# Patient Record
Sex: MALE | Race: WHITE | NOT HISPANIC OR LATINO | Employment: STUDENT | URBAN - METROPOLITAN AREA
[De-identification: names, ages, dates, MRNs, and addresses within clinical notes are randomized per-mention and may not be internally consistent; named-entity substitution may affect disease eponyms.]

---
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
## 2023-02-15 ENCOUNTER — OFFICE VISIT (OUTPATIENT)
Dept: GASTROENTEROLOGY | Facility: MEDICAL CENTER | Age: 18
End: 2023-02-15

## 2023-02-15 VITALS
BODY MASS INDEX: 21.68 KG/M2 | HEIGHT: 76 IN | OXYGEN SATURATION: 97 % | DIASTOLIC BLOOD PRESSURE: 66 MMHG | HEART RATE: 80 BPM | SYSTOLIC BLOOD PRESSURE: 106 MMHG | WEIGHT: 178 LBS

## 2023-02-15 DIAGNOSIS — K50.80 CROHN'S DISEASE OF BOTH SMALL AND LARGE INTESTINE WITHOUT COMPLICATION (HCC): Primary | ICD-10-CM

## 2023-02-15 NOTE — PROGRESS NOTES
[facility] - Outpatient Follow-up Note  George Rodriguez 21 y o  male MRN: [id number]  Encounter: [id number]          ASSESSMENT AND PLAN:   78-year-old male with history of recently diagnosed ileocolonic Crohn's disease not currently on treatment who presents for follow-up evaluation  1  Crohn's disease of both small and large intestine without complication ([facility] [address] )  He was found to have moderate ileal Crohn's disease on recent colonoscopy and CT enterography also showed wall thickening, mural hyperenhancement in the distal TI and a second segment of the ileum  At his last office visit he was provided information regarding multiple biologic medications including Humira, Stelara, Remicade given his moderate ileal Crohn's disease  He was not interested on starting biologic at that time and wanted to research the medications and dietary treaments before making a decision    During the visit today he states he is not interested in starting a biologic until after he finishes college, in 2025 due to concerns for taking and committing to the medication while at school  He was inquiring about starting prednisone or staying off treatment until then  I discussed that without treatment for his Crohn's disease the concern is for long-term development of complications such as small bowel strictures, bowel obstructions, fistulas, abscesses etc   I discussed I again recommend initiation of a biologic medication to prevent these complicatinos  We discussed that prednisone can be initiated but it is not a good long-term option for treatment due to steroid related side effects  If he is not interested in treatment, we can discuss induction of remission with prednisone, consider initiation of a mesalamine treatment such as Pentasa to try and maintain remission though I again reiterated that mesalamine treatments may not be sufficient given the severity of his Crohn's disease      He would like to research mesalamine and steroid medications and will let me know about a decision  In the meantime I recommend he obtain his previously ordered pre- biologic blood work in addition to routine blood work  - Hepatitis C antibody; Future  - Hepatitis B surface antigen; Future  - Hepatitis B core antibody, total; Future  - TPMT ENZYME ACTIVITY,BLOOD; Future  - CBC and differential; Future  - Iron Panel (Includes Ferritin, Iron Sat%, Iron, and TIBC); Future  - Vitamin B12; Future  - Vitamin D 25 hydroxy; Future  - Quantiferon TB Gold Plus; Future  - Calprotectin,Fecal; Future      Health maintenance:  - Yearly flu shot, COVID vaccination, Pneumovax/Prevnar per PCP      Return to clinic in 2 months      ______________________________________________________________________    SUBJECTIVE: 49-year-old male with history of recently diagnosed ileocolonic Crohn's disease not currently on treatment who presents for follow-up evaluation  He was last seen in the GI office November 2022  He initially presented a few months prior with bloody stools and unintentional weight loss  He underwent EGD and colonoscopy October 2022  EGD was normal other than small hiatal hernia  Colonoscopy showed moderate ulcerated and edematous, erythematous mucosa at the ileocecal valve and in the terminal ileum and patchy erythema of the sigmoid colon  Pathology of the terminal ileum showed increased mild architectural distortion and increased lymphoplasmacytic infiltration  Colonic biopsies were unremarkable  Gastric biopsies showed moderate chronic inactive gastritis negative for H  pylori and normal duodenal biopsies        December 2022 CT enterography showed active inflammatory bowel disease that luminal narrowing in the terminal ileum and a possible second site in the ileum    His last office visit he was provided information regarding multiple biologic options for treatment was unsure if he wanted to start treatment  Interval history: He denies abdominal cramping or abdominal pain  He has a bowel movement 1-3 times per day which is formed  At times he can have intermittent rectal bleeding  He reports appetite is stable as well as weight  He reports fatigue  He denies chronic joint discomfort, rashes  REVIEW OF SYSTEMS IS OTHERWISE NEGATIVE  10 point review of systems is negative other than stated as per HPI      Historical Information   Past Medical History:   Diagnosis Date   • Acne      Past Surgical History:   Procedure Laterality Date   • NOSE SURGERY       Social History   Social History     Substance and Sexual Activity   Alcohol Use Yes     Social History     Substance and Sexual Activity   Drug Use Yes   • Types: Marijuana    Comment: in past     Social History     Tobacco Use   Smoking Status Never   Smokeless Tobacco Never     Family History   Problem Relation Age of Onset   • Celiac disease Mother    • Skin cancer Father    • Eczema Father    • Hypertension Father    • Diabetes Maternal Grandmother    • Hypertension Maternal Grandmother    • Skin cancer Maternal Grandfather    • Ulcerative colitis Maternal Grandfather    • Skin cancer Paternal Grandmother    • Cancer Paternal Grandmother    • Skin cancer Paternal Grandfather    • Colon cancer Maternal Aunt    • Stroke Paternal Aunt        Meds/Allergies     No current outpatient medications on file  No Known Allergies        Objective     Blood pressure 106/66, pulse 80, height 6' 4" (1 93 m), weight 80 7 kg (178 lb), SpO2 97 %  Body mass index is 21 67 kg/m²  PHYSICAL EXAM:      General Appearance:   Alert, cooperative, no distress   HEENT:   Normocephalic, atraumatic, anicteric  Neck:  Supple, symmetrical, trachea midline   Lungs:   Clear to auscultation bilaterally; no rales, rhonchi or wheezing; respirations unlabored    Heart[de-identified]   Regular rate and rhythm; no murmur, rub, or gallop     Abdomen:   Soft, left-sided abdominal tenderness to deep palpation without rebound or guarding, non-distended; normal bowel sounds; no masses, no organomegaly    Genitalia:   Deferred    Rectal:   Deferred    Extremities:  No cyanosis, clubbing or edema    Pulses:  2+ and symmetric    Skin:  No jaundice, rashes, or lesions    Lymph nodes:  No palpable cervical lymphadenopathy        Lab Results:   No visits with results within 1 Day(s) from this visit     Latest known visit with results is:   Hospital Outpatient Visit on 10/28/2022   Component Date Value   • Case Report 10/28/2022                      Value:Surgical Pathology Report                         Case: [id number]                                   Authorizing Provider:  Michelle Downey MD       Collected:           10/28/2022 1403              Ordering Location:     [address] [place] [address]        Received:            10/28/2022 [address] Endoscopy                                                     Pathologist:           Aislinn Alvarado MD                                                           Specimens:   A) - Duodenum, Duodenum bx-cold                                                                     B) - Stomach, Gastric bx-cold                                                                       C) - Terminal Ileum, Terminal ileum bx-cold IBD                                                     D) - Large Intestine, Cecum, Cecum bx-cold                                                          E) - Large Intestine, Right/Ascending Colon, Ascending colon bx-cold                                                          F) - Large Intestine, Transverse Colon, transverse colon bx-cold                                    G) - Large Intestine, Left/Descending Colon, Descending colon bx-cold                               H) - Large Intestine, Sigmoid Colon, Sigmoid colon bx-cold                                          I) - Polyp, Colorectal, Rectal polyp-cold bx                                                        J) - Rectum, Rectum bx-cold                                                               • Final Diagnosis 10/28/2022                      Value: This result contains rich text formatting which cannot be displayed here  • Additional Information 10/28/2022                      Value: This result contains rich text formatting which cannot be displayed here  • Gross Description 10/28/2022                      Value: This result contains rich text formatting which cannot be displayed here  • Clinical Information 10/28/2022                      Value:R/O Celiac disease         Radiology Results:   No results found

## 2023-02-17 ENCOUNTER — TELEPHONE (OUTPATIENT)
Dept: GASTROENTEROLOGY | Facility: MEDICAL CENTER | Age: 18
End: 2023-02-17

## 2023-02-17 NOTE — TELEPHONE ENCOUNTER
I called and spoke with the patient  We discussed options for treatment during his recent office visit including Biologics, mesalamine and steroids  He is not interested in the long-term treatment at this time such as a biologic  His Crohn's disease was diagnosed in October and he has not started therapy given his concern of starting a treatment while he is at school  I discussed another option of starting a budesonide taper in order to treat the active inflammation seen on his CT scan and colonoscopy  We discussed that budesonide is not a long-term option but may help in the short-term to help induce remission and then we can discussed long-term options going forward would like to think about this and discussed with his parents  I will send him information regarding steroids and budesonide  He has received information previously regarding mesalamine and Biologics  He will send me a [facility] [employer] message or call our office when he has made a decision      He verbalized understanding of the information and instructions provided

## 2023-03-01 DIAGNOSIS — K52.9 ILEITIS: ICD-10-CM

## 2023-03-01 DIAGNOSIS — K50.819 CROHN'S DISEASE OF SMALL AND LARGE INTESTINES WITH COMPLICATION (HCC): Primary | ICD-10-CM

## 2023-03-01 RX ORDER — BUDESONIDE 3 MG/1
CAPSULE, COATED PELLETS ORAL
Qty: 203 CAPSULE | Refills: 0 | Status: SHIPPED | OUTPATIENT
Start: 2023-03-01 | End: 2023-05-17

## 2023-03-03 ENCOUNTER — TELEPHONE (OUTPATIENT)
Dept: PSYCHIATRY | Facility: CLINIC | Age: 18
End: 2023-03-03

## 2023-03-03 NOTE — TELEPHONE ENCOUNTER
Writer contacted pt in regards to a vm we received requesting services and informed him that there is no opening available at this time   He declined to be added to wait list

## 2023-03-13 ENCOUNTER — TELEPHONE (OUTPATIENT)
Dept: PSYCHIATRY | Facility: CLINIC | Age: 18
End: 2023-03-13

## 2023-03-13 NOTE — TELEPHONE ENCOUNTER
Patient called in and has been added to non referral wait list for both talk therapy and med mgmt  No preference on provider, in person and confirmed insurance

## 2023-05-27 DIAGNOSIS — K52.9 ILEITIS: ICD-10-CM

## 2023-05-27 DIAGNOSIS — K50.819 CROHN'S DISEASE OF SMALL AND LARGE INTESTINES WITH COMPLICATION (HCC): ICD-10-CM

## 2023-05-27 RX ORDER — BUDESONIDE 3 MG/1
CAPSULE, COATED PELLETS ORAL
Qty: 90 CAPSULE | Refills: 2 | Status: SHIPPED | OUTPATIENT
Start: 2023-05-27 | End: 2023-08-12

## 2023-06-16 DIAGNOSIS — K50.819 CROHN'S DISEASE OF SMALL AND LARGE INTESTINES WITH COMPLICATION (HCC): Primary | ICD-10-CM

## 2023-06-16 RX ORDER — MESALAMINE 500 MG/1
1000 CAPSULE, EXTENDED RELEASE ORAL 4 TIMES DAILY
Qty: 120 CAPSULE | Refills: 3 | Status: SHIPPED | OUTPATIENT
Start: 2023-06-16 | End: 2023-06-21

## 2023-06-16 RX ORDER — MESALAMINE 500 MG/1
1000 CAPSULE, EXTENDED RELEASE ORAL 4 TIMES DAILY
Qty: 120 CAPSULE | Refills: 3 | Status: SHIPPED | OUTPATIENT
Start: 2023-06-16 | End: 2023-06-16

## 2023-06-19 DIAGNOSIS — K50.819 CROHN'S DISEASE OF SMALL AND LARGE INTESTINES WITH COMPLICATION (HCC): ICD-10-CM

## 2023-06-21 DIAGNOSIS — K50.819 CROHN'S DISEASE OF SMALL AND LARGE INTESTINES WITH COMPLICATION (HCC): ICD-10-CM

## 2023-06-21 RX ORDER — MESALAMINE 500 MG/1
1000 CAPSULE, EXTENDED RELEASE ORAL 4 TIMES DAILY
Qty: 120 CAPSULE | Refills: 3 | Status: SHIPPED | OUTPATIENT
Start: 2023-06-21

## 2023-06-21 RX ORDER — MESALAMINE 500 MG/1
CAPSULE, EXTENDED RELEASE ORAL
Qty: 120 CAPSULE | Refills: 3 | Status: SHIPPED | OUTPATIENT
Start: 2023-06-21 | End: 2023-06-21 | Stop reason: SDUPTHER

## 2023-08-09 ENCOUNTER — TELEPHONE (OUTPATIENT)
Age: 18
End: 2023-08-09

## 2023-08-09 DIAGNOSIS — K50.819 CROHN'S DISEASE OF SMALL AND LARGE INTESTINES WITH COMPLICATION (HCC): Primary | ICD-10-CM

## 2023-08-09 NOTE — TELEPHONE ENCOUNTER
Patients GI provider:  Dr. Tawanna Dee    Number to return call: [phone]    Reason for call: Wendi Obregon from [facility] in [facility] calling stating pt's Pentasa 500 mg medication is being discontinued and they were wondering if there is an alternative medication that can replace this. Wendi Obregon can be reached at the above number for any questions.     Scheduled procedure/appointment date if applicable: N/A

## 2023-08-10 DIAGNOSIS — K50.819 CROHN'S DISEASE OF SMALL AND LARGE INTESTINES WITH COMPLICATION (HCC): ICD-10-CM

## 2023-08-10 RX ORDER — MESALAMINE 500 MG/1
1000 CAPSULE, EXTENDED RELEASE ORAL 4 TIMES DAILY
Qty: 120 CAPSULE | Refills: 3 | Status: SHIPPED | OUTPATIENT
Start: 2023-08-10 | End: 2023-08-11

## 2023-08-11 DIAGNOSIS — K50.819 CROHN'S DISEASE OF SMALL AND LARGE INTESTINES WITH COMPLICATION (HCC): Primary | ICD-10-CM

## 2023-08-11 NOTE — TELEPHONE ENCOUNTER
I spoke with the patient. Pt aware local pharmacies unable to refill 500 mg mesalamine. Pt agreeable to plan of 250 mg mesalamine. Pt has 4 days left of 500 mg mesalamine. Advised patient to call [facility] later today to confirm medication is available by Monday and to call our office with any concerns.

## 2023-08-11 NOTE — TELEPHONE ENCOUNTER
I spoke with [facility]. Test claim for 250 mg Pentasa GenericTotal 1,000 mg daily $20 for 30 day supply.

## 2023-09-09 DIAGNOSIS — K50.819 CROHN'S DISEASE OF SMALL AND LARGE INTESTINES WITH COMPLICATION (HCC): ICD-10-CM

## 2023-09-11 ENCOUNTER — TELEPHONE (OUTPATIENT)
Dept: GASTROENTEROLOGY | Facility: MEDICAL CENTER | Age: 18
End: 2023-09-11

## 2023-09-11 NOTE — TELEPHONE ENCOUNTER
Requested medication(s) are due for refill today: Yes  Patient has already received a courtesy refill: No  Other reason request has been forwarded to provider: guidelines fail, Medication failed protocol.  Please forward to your office staff for further review as this medication was reviewed by the Medication Management Team.

## 2023-10-23 DIAGNOSIS — K50.819 CROHN'S DISEASE OF SMALL AND LARGE INTESTINES WITH COMPLICATION (HCC): ICD-10-CM

## 2023-10-23 RX ORDER — MESALAMINE 500 MG/1
CAPSULE, EXTENDED RELEASE ORAL
Qty: 720 CAPSULE | Refills: 1 | Status: SHIPPED | OUTPATIENT
Start: 2023-10-23

## 2023-11-12 DIAGNOSIS — K50.819 CROHN'S DISEASE OF SMALL AND LARGE INTESTINES WITH COMPLICATION (HCC): ICD-10-CM

## 2023-11-13 RX ORDER — MESALAMINE 500 MG/1
CAPSULE, EXTENDED RELEASE ORAL
Qty: 720 CAPSULE | Refills: 3 | Status: SHIPPED | OUTPATIENT
Start: 2023-11-13

## 2023-12-06 ENCOUNTER — OFFICE VISIT (OUTPATIENT)
Dept: GASTROENTEROLOGY | Facility: MEDICAL CENTER | Age: 18
End: 2023-12-06
Payer: COMMERCIAL

## 2023-12-06 VITALS
DIASTOLIC BLOOD PRESSURE: 78 MMHG | HEIGHT: 76 IN | OXYGEN SATURATION: 98 % | SYSTOLIC BLOOD PRESSURE: 122 MMHG | WEIGHT: 196 LBS | TEMPERATURE: 97.6 F | HEART RATE: 74 BPM | BODY MASS INDEX: 23.87 KG/M2

## 2023-12-06 DIAGNOSIS — R21 RASH: ICD-10-CM

## 2023-12-06 DIAGNOSIS — K50.819 CROHN'S DISEASE OF SMALL AND LARGE INTESTINES WITH COMPLICATION (HCC): Primary | ICD-10-CM

## 2023-12-06 PROCEDURE — 99214 OFFICE O/P EST MOD 30 MIN: CPT | Performed by: INTERNAL MEDICINE

## 2023-12-06 RX ORDER — MESALAMINE 500 MG/1
CAPSULE, EXTENDED RELEASE ORAL
Qty: 720 CAPSULE | Refills: 3 | Status: SHIPPED | OUTPATIENT
Start: 2023-12-06

## 2023-12-06 RX ORDER — METHYLPHENIDATE HYDROCHLORIDE 18 MG/1
TABLET, EXTENDED RELEASE ORAL
COMMUNITY
Start: 2023-11-07

## 2023-12-06 NOTE — PROGRESS NOTES
[facility] - Outpatient Follow-up Note  Dennys Esquivel 19 y.o. male MRN: [id number]  Encounter: [id number]          ASSESSMENT AND PLAN:  70-year-old male with history of ileal Crohn's disease diagnosed 2022, currently on Pentasa who presents for follow-up evaluation. 1. Crohn's disease of small and large intestines with complication ([address])  2. Rash  He was found to have moderate ileal Crohn's disease on colonoscopy in 2022 after presenting with rectal bleeding, and unintentional weight loss. He was recommended to start biologic treatment, but preferred trialing on biologic treatments first.  He was started on a course of budesonide and transition to Pentasa, 4 g daily which he has been taking for 6 months. He has continued to have intermittent abdominal pain which is improved but his appetite has improved and he has gained 30 to 40 pounds since being on the medication. I recommend repeating CT enterography and colonoscopy to evaluate for endoscopic healing. If he continues to have active disease he would benefit from transition to a biologic at that time. - Continue treatment with Pentasa 4 g daily. He will be due for blood work at the follow-up visit. - Your next colonoscopy is due spring 2024    Health maintenance:  - He has not established with a primary care doctor and I placed referral to internal medicine to establish with a PCP  - Yearly flu shot. Avoid live vaccines  - Pneumovax and Prevnar 13 every 5 years  - Yearly skin exam.  He also reports thigh rash/acne. I have placed dermatology referral.    Return to clinic in 3 months    - CT small bowel enterography; Future  - Ambulatory Referral to Internal Medicine; Future  - Ambulatory Referral to Dermatology;  Future      ______________________________________________________________________    SUBJECTIVE: 70-year-old male with history of ileal Crohn's disease diagnosed 2022, currently on Pentasa who presents for follow-up evaluation. He was last seen in the GI office February 2023. At that time he was interested in starting treatment with budesonide and was ordered for a taper of budesonide in March 2023 with transition to mesalamine in June. Interval history: He completed the budesonide course and is taking mesalamine 4 pills twice daily. He reports improvement of his weight and is now 196 pounds or as before he was in the 150s when he was feeling unwell. He continues to have intermittent cramping discomfort throughout the abdomen. He has rare episodes of severe "flares". He did have an episode of vomiting in the setting of a viral infection. His bowel movements usually 1-3 times a day. He reports a greenish color to his stool and can sometimes be white appearing as well. He denies rectal bleeding. IBD history:    He initially presented a few months prior with bloody stools and unintentional weight loss. He underwent EGD and colonoscopy October 2022. EGD was normal other than small hiatal hernia. Colonoscopy showed moderate ulcerated and edematous, erythematous mucosa at the ileocecal valve and in the terminal ileum and patchy erythema of the sigmoid colon. Pathology of the terminal ileum showed increased mild architectural distortion and increased lymphoplasmacytic infiltration. Colonic biopsies were unremarkable. Gastric biopsies showed moderate chronic inactive gastritis negative for H. pylori and normal duodenal biopsies. Prior treatments  Steroids: Budesonide course used in March 2023  Mesalamine: Currently on Pentasa 4 g daily  Immunomodulators: No  Biologic: No    He has no prior surgical history related to Crohn's disease  He has no history of perianal disease  He reports no extraintestinal manifestations such as eye disease. He reports joint discomfort and facial and bilateral thigh rash.           December 2022 CT enterography showed active inflammatory bowel disease that luminal narrowing in the terminal ileum and a possible second site in the ileum    REVIEW OF SYSTEMS IS OTHERWISE NEGATIVE. 10 point review of systems is negative other than stated as per HPI    Historical Information   Past Medical History:   Diagnosis Date    Acne      Past Surgical History:   Procedure Laterality Date    NOSE SURGERY       Social History   Social History     Substance and Sexual Activity   Alcohol Use Yes     Social History     Substance and Sexual Activity   Drug Use Yes    Types: Marijuana    Comment: in past     Social History     Tobacco Use   Smoking Status Never   Smokeless Tobacco Never     Family History   Problem Relation Age of Onset    Celiac disease Mother     Skin cancer Father     Eczema Father     Hypertension Father     Diabetes Maternal Grandmother     Hypertension Maternal Grandmother     Skin cancer Maternal Grandfather     Ulcerative colitis Maternal Grandfather     Skin cancer Paternal Grandmother     Cancer Paternal Grandmother     Skin cancer Paternal Grandfather     Colon cancer Maternal Aunt     Stroke Paternal Aunt        Meds/Allergies       Current Outpatient Medications:     Concerta 18 MG ER tablet    mesalamine (PENTASA) 500 mg CR capsule    No Known Allergies        Objective     Blood pressure 122/78, pulse 74, temperature 97.6 °F (36.4 °C), height 6' 4" (1.93 m), weight 88.9 kg (196 lb), SpO2 98 %. Body mass index is 23.86 kg/m². PHYSICAL EXAM:      General Appearance:   Alert, cooperative, no distress   HEENT:   Normocephalic, atraumatic, anicteric. Neck:  Supple, symmetrical, trachea midline   Lungs:   Clear to auscultation bilaterally; no rales, rhonchi or wheezing; respirations unlabored    Heart[de-identified]   Regular rate and rhythm; no murmur, rub, or gallop.    Abdomen:   Soft, mild lower abdominal tenderness to deep palpation without rebound or guarding non-distended; normal bowel sounds; no masses, no organomegaly    Genitalia:   Deferred    Rectal:   Deferred Extremities:  No cyanosis, clubbing or edema    Pulses:  2+ and symmetric    Skin:  No jaundice, rashes, or lesions    Lymph nodes:  No palpable cervical lymphadenopathy        Lab Results:   No visits with results within 1 Day(s) from this visit.    Latest known visit with results is:   Hospital Outpatient Visit on 10/28/2022   Component Date Value    Case Report 10/28/2022                      Value:Surgical Pathology Report                         Case: [id number]                                   Authorizing Provider:  Rosalba Blakely MD       Collected:           10/28/2022 1403              Ordering Location:     [address] End        Received:            10/28/2022 [address] [facility] Endoscopy                                                     Pathologist:           Lisa St MD                                                           Specimens:   A) - Duodenum, Duodenum bx-cold                                                                     B) - Stomach, Gastric bx-cold                                                                       C) - Terminal Ileum, Terminal ileum bx-cold IBD                                                     D) - Large Intestine, Cecum, Cecum bx-cold                                                          E) - Large Intestine, Right/Ascending Colon, Ascending colon bx-cold                                                          F) - Large Intestine, Transverse Colon, transverse colon bx-cold                                    G) - Large Intestine, Left/Descending Colon, Descending colon bx-cold                               H) - Large Intestine, Sigmoid Colon, Sigmoid colon bx-cold                                          I) - Polyp, Colorectal, Rectal polyp-cold bx                                                        J) - Rectum, Rectum bx-cold                                                                Final Diagnosis 10/28/2022                      Value: This result contains rich text formatting which cannot be displayed here. Additional Information 10/28/2022                      Value: This result contains rich text formatting which cannot be displayed here. Gross Description 10/28/2022                      Value: This result contains rich text formatting which cannot be displayed here. Clinical Information 10/28/2022                      Value:R/O Celiac disease         Radiology Results:   No results found.

## 2024-01-03 ENCOUNTER — TELEPHONE (OUTPATIENT)
Age: 19
End: 2024-01-03

## 2024-01-03 NOTE — TELEPHONE ENCOUNTER
Scheduled date of colonoscopy (as of today): 3/18/24  Physician performing colonoscopy: Jaiyeola  Location of colonoscopy:   Bowel prep reviewed with patient: Rolo/Corina  Instructions reviewed with patient by: provided to the patient at the office  Clearances: n/a

## 2024-01-09 ENCOUNTER — HOSPITAL ENCOUNTER (OUTPATIENT)
Dept: CT IMAGING | Facility: HOSPITAL | Age: 19
Discharge: HOME | End: 2024-01-09
Attending: INTERNAL MEDICINE
Payer: COMMERCIAL

## 2024-01-09 DIAGNOSIS — K50.819 CROHN'S DISEASE OF SMALL AND LARGE INTESTINES WITH COMPLICATION (HCC): ICD-10-CM

## 2024-01-09 PROCEDURE — 74177 CT ABD & PELVIS W/CONTRAST: CPT

## 2024-01-09 PROCEDURE — G1004 CDSM NDSC: HCPCS

## 2024-01-09 RX ADMIN — IOHEXOL 100 ML: 350 INJECTION, SOLUTION INTRAVENOUS at 15:36

## 2024-01-17 ENCOUNTER — TELEPHONE (OUTPATIENT)
Dept: GASTROENTEROLOGY | Facility: MEDICAL CENTER | Age: 19
End: 2024-01-17

## 2024-01-17 NOTE — TELEPHONE ENCOUNTER
I called the patient with the results of his CT enterography.  This showed persistent active inflammatory bowel disease and a short segment of the terminal ileum and long segment in the right lower quadrant increased in extent from the prior exam in December 2022.  There were no penetrating or stricturing complication.  I discussed these results with the patient.  As we had previously discussed at our last office visit given the severity of inflammation on his October 2022 colonoscopy he would benefit from a biologic treatment but was not interested in biologic treatment at that time.  He started budesonide course and is currently on Pentasa.  Given failure of the Pentasa and recommend transitioning to a biologic.  I will send him options for treatment and he will require 3 biologic testing hepatitis B serologies and quant gold.  He will contact my office in 2 weeks if he has additional questions about the biologic treatment so that the prior authorization process can be started.  He is currently scheduled for colonoscopy in March.  However after starting a new treatment the colonoscopy would likely be rescheduled for 6 months after biologic initiation.  He verbalized understanding of the information and instructions provided.

## 2024-02-07 ENCOUNTER — TELEPHONE (OUTPATIENT)
Dept: GASTROENTEROLOGY | Facility: MEDICAL CENTER | Age: 19
End: 2024-02-07

## 2024-02-07 NOTE — TELEPHONE ENCOUNTER
I called the patient to follow-up on our phone call from last month.  He underwent a CT enterography showing persistent active inflammatory bowel disease increase extent from the prior exam with no evidence of stricturing or penetrating complications.  I sent him information about biologic treatments to discuss starting therapy.  He states he has not yet reviewed these and has no additional questions regarding the biologic treatments.  I recommend cancel his colonoscopy in March as this is unlikely to change his management.  Instead he will be scheduled for an office visit to discuss risk/benefit of biologic options.  I discussed that it is important to initiate treatment for him to prevent complications such as bowel blockages, fistula, abscesses develop in the future.  He verbalized understanding of the information and instructions provided    [facility]: Please cancel the patient's colonoscopy currently scheduled in March replace that with an office visit with myself in February or March. He should be able to use a more urgent office visit spot due to history of severe Crohn's disease.

## 2024-02-28 ENCOUNTER — TELEPHONE (OUTPATIENT)
Age: 19
End: 2024-02-28

## 2024-02-28 NOTE — TELEPHONE ENCOUNTER
Patients GI provider:  Dr. Jaiyeola    Number to return call: [phone]    Reason for call: Pt calling to resched urgent apt. Transferred to [facility] in triage for further assistance.    Scheduled procedure/appointment date if applicable: Apt 02/29/2024

## 2024-02-28 NOTE — TELEPHONE ENCOUNTER
Pt transferred to myself by Jacqueline. Pt with Urgent appointment scheduled for 02/29/24 requests reschedule d/t conflict with school schedule. New visit scheduled for 03/07/24

## 2024-03-07 ENCOUNTER — OFFICE VISIT (OUTPATIENT)
Dept: GASTROENTEROLOGY | Facility: MEDICAL CENTER | Age: 19
End: 2024-03-07
Payer: COMMERCIAL

## 2024-03-07 VITALS
HEART RATE: 85 BPM | DIASTOLIC BLOOD PRESSURE: 70 MMHG | TEMPERATURE: 98.2 F | SYSTOLIC BLOOD PRESSURE: 110 MMHG | HEIGHT: 76 IN | WEIGHT: 195 LBS | BODY MASS INDEX: 23.75 KG/M2

## 2024-03-07 DIAGNOSIS — K50.819 CROHN'S DISEASE OF SMALL AND LARGE INTESTINES WITH COMPLICATION (HCC): Primary | ICD-10-CM

## 2024-03-07 PROCEDURE — 99214 OFFICE O/P EST MOD 30 MIN: CPT | Performed by: INTERNAL MEDICINE

## 2024-03-07 NOTE — PROGRESS NOTES
[facility] - Outpatient Follow-up Note  Gigi Scott 18 y.o. male MRN: [id number]  Encounter: [id number]          ASSESSMENT AND PLAN:   18-year-old male with history of ileal Crohn's disease diagnosed 2022, currently on Pentasa who presents for follow-up evaluation.    1. Crohn's disease of small and large intestines with complication (HCC)  He was found to have moderate ileal Crohn's disease on colonoscopy in 2022 after presenting with rectal bleeding, and unintentional weight loss.  He was recommended to start biologic treatment, but preferred trialing non biologic treatments first due to concern of long-term side effects of medications.  He was started on a course of budesonide and transition to Pentasa, 4 g daily and had been on the medication for greater than 6 months when repeat CT enterography was performed January 2024 showing persistent active inflammatory small bowel disease which had increased in extent from his prior exam.  I again today discussed the diagnosis of Crohn's disease with him today how this is a chronic autoimmune condition and goal for treatment is induction and ultimate maintenance of remission.  We discussed that if left untreated Crohn's disease can result in strictures, fistula, abdominal abscesses and other complications that may require surgery in the future.  I reiterated the importance of starting a biologic treatment and had a thorough discussion with him today regarding the options including Skyrizi, Stelara, anti-TNF agents and Entyvio.  He is very concerned about the risk of being on a biologic medicine including risk of infection.  I discussed that  given his ongoing, active Crohn's disease the benefits of the medication outweigh the risk to prevent long-term complications.  I discussed that mesalamine with his severity of Crohn's disease is unlikely helping given the progressive inflammation seen on repeat CT enterography  I offered him  initiation of another course of budesonide or preferably prednisone given his symptoms however he would like to think about which medicine he would like to start.  I provided him the information regarding the Biologics and he will contact the office in 2 weeks with a decision.  He is also due for hepatitis B and quant of Farren gold serologies which should be completed prior to initiation of a biologic.  Once he has started on a biologic we will repeat CT enterography in 6 to 12 months and colonoscopy to document healing.  - Hepatic function panel; Future  - Quantiferon TB Gold Plus Assay; Future  - Hepatitis B core antibody, total; Future  - Hepatitis B surface antigen; Future  - CBC and differential; Future  - Discuss IBD health maintenance at next visit    Follow up in 2 months  ______________________________________________________________________    SUBJECTIVE:   18-year-old male with history of ileal Crohn's disease diagnosed 2022, currently on Pentasa who presents for follow-up evaluation.    He was last seen in the GI office December 2023.  He was found to have moderate ileal Crohn's disease on colonoscopy in 2022 after presenting with rectal bleeding and unintentional weight loss.  At that time he was recommended to start a biologic treatment but preferred avoiding Biologics and was started on use of budesonide with transition to Pentasa.    Interval history: He underwent repeat CT enterography January 2024 showing active inflammatory small bowel disease involving a short segment of the terminal ileum and long continuous segment of the distal ileum in the right lower quadrant increased in extent from the prior exam in December 2022 with no evidence of stricturing.      He states that he did not receive additional mesalamine through the pharmacy and thought that the medication was discontinued by our office.  He has been off the medication for approximately 6 weeks.  He reports cramping abdominal pain.  His  bowel movements are usually 1-2 times daily and can be soft.  He denies rectal bleeding or mucus in his stools.  His weight has been stable.      IBD history:     He initially presented a few months prior with bloody stools and unintentional weight loss.  He underwent EGD and colonoscopy October 2022.  EGD was normal other than small hiatal hernia.  Colonoscopy showed moderate ulcerated and edematous, erythematous mucosa at the ileocecal valve and in the terminal ileum and patchy erythema of the sigmoid colon.  Pathology of the terminal ileum showed increased mild architectural distortion and increased lymphoplasmacytic infiltration.  Colonic biopsies were unremarkable.  Gastric biopsies showed moderate chronic inactive gastritis negative for H. pylori and normal duodenal biopsies.     Prior treatments  Steroids: Budesonide course used in March 2023  Mesalamine: Currently on Pentasa 4 g daily  Immunomodulators: No  Biologic: No     He has no prior surgical history related to Crohn's disease  He has no history of perianal disease  He reports no extraintestinal manifestations such as eye disease.  He reports joint discomfort and facial and bilateral thigh rash.             December 2022 CT enterography showed active inflammatory bowel disease that luminal narrowing in the terminal ileum and a possible second site in the ileum    REVIEW OF SYSTEMS IS OTHERWISE NEGATIVE.  10 point review of systems is negative other than stated as per HPI    Historical Information   Past Medical History:   Diagnosis Date    Acne      Past Surgical History:   Procedure Laterality Date    NOSE SURGERY       Social History   Social History     Substance and Sexual Activity   Alcohol Use Not Currently     Social History     Substance and Sexual Activity   Drug Use Not Currently    Types: Marijuana    Comment: in past     Social History     Tobacco Use   Smoking Status Never   Smokeless Tobacco Never     Family History   Problem Relation Age  "of Onset    Celiac disease Mother     Skin cancer Father     Eczema Father     Hypertension Father     Diabetes Maternal Grandmother     Hypertension Maternal Grandmother     Skin cancer Maternal Grandfather     Ulcerative colitis Maternal Grandfather     Skin cancer Paternal Grandmother     Cancer Paternal Grandmother     Skin cancer Paternal Grandfather     Colon cancer Maternal Aunt     Stroke Paternal Aunt        Meds/Allergies       Current Outpatient Medications:     Concerta 18 MG ER tablet    mesalamine (PENTASA) 500 mg CR capsule    No Known Allergies        Objective     Blood pressure 110/70, pulse 85, temperature 98.2 °F (36.8 °C), temperature source Tympanic, height 6' 4\" (1.93 m), weight 88.5 kg (195 lb). Body mass index is 23.74 kg/m².      PHYSICAL EXAM:      General Appearance:   Alert, cooperative, no distress   HEENT:   Normocephalic, atraumatic, anicteric.     Neck:  Supple, symmetrical, trachea midline   Lungs:   Clear to auscultation bilaterally; no rales, rhonchi or wheezing; respirations unlabored    Heart::   Regular rate and rhythm; no murmur, rub, or gallop.   Abdomen:   Soft, mild generalized abdominal tenderness to deep palpation without rebound or guarding non-distended; normal bowel sounds; no masses, no organomegaly    Genitalia:   Deferred    Rectal:   Deferred    Extremities:  No cyanosis, clubbing or edema    Pulses:  2+ and symmetric    Skin:  No jaundice, rashes, or lesions    Lymph nodes:  No palpable cervical lymphadenopathy        Lab Results:   No visits with results within 1 Day(s) from this visit.   Latest known visit with results is:   Hospital Outpatient Visit on 10/28/2022   Component Date Value    Case Report 10/28/2022                      Value:Surgical Pathology Report                         Case: [id number]                                   Authorizing Provider:  Diana M Jaiyeola, MD       Collected:           10/28/2022 1403              Ordering Location:     [facility]" [facility]        Received:            10/28/2022 [address] [place] [address] Endoscopy                                                     Pathologist:           Presley Ho MD                                                           Specimens:   A) - Duodenum, Duodenum bx-cold                                                                     B) - Stomach, Gastric bx-cold                                                                       C) - Terminal Ileum, Terminal ileum bx-cold IBD                                                     D) - Large Intestine, Cecum, Cecum bx-cold                                                          E) - Large Intestine, Right/Ascending Colon, Ascending colon bx-cold                                                          F) - Large Intestine, Transverse Colon, transverse colon bx-cold                                    G) - Large Intestine, Left/Descending Colon, Descending colon bx-cold                               H) - Large Intestine, Sigmoid Colon, Sigmoid colon bx-cold                                          I) - Polyp, Colorectal, Rectal polyp-cold bx                                                        J) - Rectum, Rectum bx-cold                                                                Final Diagnosis 10/28/2022                      Value:This result contains rich text formatting which cannot be displayed here.    Additional Information 10/28/2022                      Value:This result contains rich text formatting which cannot be displayed here.    Gross Description 10/28/2022                      Value:This result contains rich text formatting which cannot be displayed here.    Clinical Information 10/28/2022                      Value:R/O Celiac disease         Radiology Results:   No results found.

## 2024-04-18 ENCOUNTER — OFFICE VISIT (OUTPATIENT)
Dept: INTERNAL MEDICINE CLINIC | Facility: CLINIC | Age: 19
End: 2024-04-18
Payer: COMMERCIAL

## 2024-04-18 VITALS
BODY MASS INDEX: 24.96 KG/M2 | WEIGHT: 205 LBS | HEIGHT: 76 IN | DIASTOLIC BLOOD PRESSURE: 64 MMHG | SYSTOLIC BLOOD PRESSURE: 120 MMHG | HEART RATE: 91 BPM | OXYGEN SATURATION: 98 %

## 2024-04-18 DIAGNOSIS — E61.1 IRON DEFICIENCY: ICD-10-CM

## 2024-04-18 DIAGNOSIS — E53.8 B12 DEFICIENCY: ICD-10-CM

## 2024-04-18 DIAGNOSIS — F90.0 ATTENTION DEFICIT HYPERACTIVITY DISORDER (ADHD), PREDOMINANTLY INATTENTIVE TYPE: ICD-10-CM

## 2024-04-18 DIAGNOSIS — E55.9 VITAMIN D DEFICIENCY: ICD-10-CM

## 2024-04-18 DIAGNOSIS — L70.0 ACNE VULGARIS: ICD-10-CM

## 2024-04-18 DIAGNOSIS — K50.80 CROHN'S DISEASE OF BOTH SMALL AND LARGE INTESTINE WITHOUT COMPLICATION (HCC): ICD-10-CM

## 2024-04-18 DIAGNOSIS — Z00.00 ANNUAL PHYSICAL EXAM: Primary | ICD-10-CM

## 2024-04-18 PROCEDURE — 99213 OFFICE O/P EST LOW 20 MIN: CPT | Performed by: INTERNAL MEDICINE

## 2024-04-18 PROCEDURE — 99385 PREV VISIT NEW AGE 18-39: CPT | Performed by: INTERNAL MEDICINE

## 2024-04-18 NOTE — ASSESSMENT & PLAN NOTE
-Encouraged the patient to strongly consider starting a biologic agent given his active disease.  -Appreciate GI follow-up.  -Discussed malabsorption of vitamins as a concern with his active Crohn's.  A vitamin D, B12 and iron profile have been ordered.  -Patient would like to get a second opinion.  A referral has been generated.

## 2024-04-18 NOTE — ASSESSMENT & PLAN NOTE
-Doing well on current dose of Concerta  -Patient would like to establish with a local psychiatrist.  A referral has been generated.

## 2024-04-18 NOTE — PROGRESS NOTES
ADULT ANNUAL PHYSICAL  [facility]    NAME: Gigi Scott  AGE: 18 y.o. SEX: male  : 2005     DATE: 2024     Assessment and Plan:     Problem List Items Addressed This Visit     Crohn's disease of both small and large intestine without complication (HCC)     -Encouraged the patient to strongly consider starting a biologic agent given his active disease.  -Appreciate GI follow-up.  -Discussed malabsorption of vitamins as a concern with his active Crohn's.  A vitamin D, B12 and iron profile have been ordered.  -Patient would like to get a second opinion.  A referral has been generated.         Relevant Orders    CBC and differential    Comprehensive metabolic panel    Vitamin D 25 hydroxy    Vitamin B12    Ambulatory Referral to Gastroenterology    Attention deficit hyperactivity disorder (ADHD), predominantly inattentive type     -Doing well on current dose of Concerta  -Patient would like to establish with a local psychiatrist.  A referral has been generated.         Relevant Orders    Ambulatory referral to Psych Services    Acne vulgaris     -Patient reports he already has a dermatology referral on hand.  He plans to contact them to schedule an appointment.        Other Visit Diagnoses     Annual physical exam    -  Primary    Relevant Orders    CBC and differential    Comprehensive metabolic panel    Vitamin D deficiency        Relevant Orders    Vitamin D 25 hydroxy    B12 deficiency        Relevant Orders    Vitamin B12    Iron deficiency        Relevant Orders    Iron Panel (Includes Ferritin, Iron Sat%, Iron, and TIBC)          Immunizations and preventive care screenings were discussed with patient today. Appropriate education was printed on patient's after visit summary.           Return in about 6 months (around 10/18/2024).     Chief Complaint:     Chief Complaint   Patient presents with   • Establish Care      History of Present  Illness:     Adult Annual Physical   Patient here for a comprehensive physical exam and to establish care.  His past medical history is most notable for Crohn's disease.  He states he was diagnosed with Crohn's in 2022.  The patient has since established care with our GI group.  He reports he was on mesalamine for a short period of time.  He states his most recent CT enterography in January showed persistent active inflammatory small bowel disease.  The patient reports his gastroenterologist has recommended that he start a biologic agent however he does not want to start this until he graduates from college.  He has concerns about the immunosuppressive effects.  Currently he reports he typically has 1 loose stool a day.  On occasion he does notice some blood in his stool.    He also has a history of ADHD and is currently on Concerta.  He is followed by a psychiatrist through telehealth.  He is currently on Concerta.      Depression Screening  PHQ-2/9 Depression Screening    Little interest or pleasure in doing things: 1 - several days  Feeling down, depressed, or hopeless: 1 - several days  PHQ-2 Score: 2  PHQ-2 Interpretation: Negative depression screen          Review of Systems:     Review of Systems  All other systems negative except for pertinent findings noted in HPI.      Past Medical History:     Past Medical History:   Diagnosis Date   • Acne    • Crohn disease (HCC)       Past Surgical History:     Past Surgical History:   Procedure Laterality Date   • NOSE SURGERY        Social History:     Social History     Socioeconomic History   • Marital status: Single     Spouse name: None   • Number of children: None   • Years of education: None   • Highest education level: None   Occupational History   • None   Tobacco Use   • Smoking status: Never     Passive exposure: Never   • Smokeless tobacco: Never   Vaping Use   • Vaping status: Never Used   Substance and Sexual Activity   • Alcohol use: Not Currently   •  "Drug use: Not Currently     Types: Marijuana     Comment: in past   • Sexual activity: None   Other Topics Concern   • None   Social History Narrative   • None     Social Determinants of Health     Financial Resource Strain: Not on file   Food Insecurity: Not on file   Transportation Needs: Not on file   Physical Activity: Not on file   Stress: Not on file   Social Connections: Not on file   Intimate Partner Violence: Not on file   Housing Stability: Not on file      Family History:     Family History   Problem Relation Age of Onset   • Celiac disease Mother    • Skin cancer Father    • Eczema Father    • Hypertension Father    • Diabetes Maternal Grandmother    • Hypertension Maternal Grandmother    • Skin cancer Maternal Grandfather    • Ulcerative colitis Maternal Grandfather    • Skin cancer Paternal Grandmother    • Cancer Paternal Grandmother    • Skin cancer Paternal Grandfather    • Colon cancer Maternal Aunt    • Stroke Paternal Aunt       Current Medications:     Current Outpatient Medications   Medication Sig Dispense Refill   • Concerta 18 MG ER tablet 36 mg       No current facility-administered medications for this visit.      Allergies:     No Known Allergies   Physical Exam:     /64 (BP Location: Left arm, Patient Position: Sitting, Cuff Size: Large)   Pulse 91   Ht 6' 4\" (1.93 m)   Wt 93 kg (205 lb)   SpO2 98%   BMI 24.95 kg/m²     Physical Exam   General: NAD  HEENT: NCAT, EOMI, normal conjunctiva  Cardiovascular: RRR, normal S1 and S2, no m/r/g  Pulmonary: Normal respiratory effort, no wheezes, rales or rhonchi  GI: Soft, nontender, nondistended, normoactive bowel sounds  Musculoskeletal: Normal bulk and tone  Neuro: Non-focal, ambulating without difficulty, non-antalgic gait  Extremities: No lower extremity edema  Skin: Acne vulgaris  Psychiatric: Normal mood and affect    Jude Alegria MD   [facility]"

## 2024-04-18 NOTE — ASSESSMENT & PLAN NOTE
-Patient reports he already has a dermatology referral on hand.  He plans to contact them to schedule an appointment.

## 2024-05-03 ENCOUNTER — TELEPHONE (OUTPATIENT)
Dept: PSYCHIATRY | Facility: CLINIC | Age: 19
End: 2024-05-03

## 2024-05-03 NOTE — TELEPHONE ENCOUNTER
"Behavioral Health Outpatient Intake Questions    Referred By   : PCP     Please advise interviewee that they need to answer all questions truthfully to allow for best care, and any misrepresentations of information may affect their ability to be seen at this clinic   => Was this discussed? YES     If Minor Child (under age 18)    Who is/are the legal guardian(s) of the child?     Is there a custody agreement? No     If \"YES\"- Custody orders must be obtained prior to scheduling the first appointment  In addition, Consent to Treatment must be signed by all legal guardians prior to scheduling the first appointment    If \"NO\"- Consent to Treatment must be signed by all legal guardians prior to scheduling the first appointment    Behavioral Health Outpatient Intake History -     Presenting Problem (in patient's own words): Diagnosed ADHD, doesn't like telehealth at this time but suspects he has more conditions.     Are there any communication barriers for this patient?     Yes                                               If yes, please describe barriers: ADHD  If there is a unique situation, please refer to Robert Goldberg/Olga Frank for final determination.    Are you taking any psychiatric medications? Yes     If \"YES\" -What are they unsure      If \"YES\" -Who prescribes? Telehealth Prescribes     Has the Patient previously received outpatient Talk Therapy or Medication Management from [facility]  Yes        If \"YES\"- When, Where and with Whom? Telehealth Service since October 2023        If \"NO\" -Has Patient received these services elsewhere?       If \"YES\" -When, Where, and with Whom?    Has the Patient abused alcohol or other substances in the last 6 months ? No  No concerns of substance abuse are reported.     If \"YES\" -What substance, How much, How often?     If illegal substance: Refer to [facility] (for NATHANAEL) or [facility].   If Alcohol in excess of 10 drinks per week:  Refer to [facility] (for NATHANAEL) " "or [facility]    Legal History-     Is this treatment court ordered? No   If \"yes \"send to :  Talk Therapy : Send to Robert Goldberg/Olga Frank for final determination   Med Management: Send to Dr Viera for final determination     Has the Patient been convicted of a felony?  No   If \"Yes\" send to -When, What?  Talk Therapy : Send to Robert Frank for final determination   Med Management: Send to Dr Viera for final determination     ACCEPTED as a patient Yes  If \"Yes\" Appointment Date: 7/29  10AM  8/23 9:30AM    Referred Elsewhere? No  If “Yes” - (Where? Ex: [facility], [facility], [facility], [facility], etc.)       Name of Insurance Co:[employer]   Insurance ID#[id number]   Insurance Phone #  If ins is primary or secondary?Primary   If patient is a minor, parents information such as Name, D.O.B of guarantor.  "

## 2024-07-29 ENCOUNTER — OFFICE VISIT (OUTPATIENT)
Dept: PSYCHIATRY | Facility: CLINIC | Age: 19
End: 2024-07-29
Payer: COMMERCIAL

## 2024-07-29 DIAGNOSIS — F32.A DEPRESSION, UNSPECIFIED DEPRESSION TYPE: ICD-10-CM

## 2024-07-29 DIAGNOSIS — F90.0 ATTENTION DEFICIT HYPERACTIVITY DISORDER, INATTENTIVE TYPE: Primary | ICD-10-CM

## 2024-07-29 PROCEDURE — 90792 PSYCH DIAG EVAL W/MED SRVCS: CPT | Performed by: PSYCHIATRY & NEUROLOGY

## 2024-07-29 RX ORDER — METHYLPHENIDATE HYDROCHLORIDE 36 MG/1
36 TABLET ORAL DAILY
Qty: 30 TABLET | Refills: 0 | Status: SHIPPED | OUTPATIENT
Start: 2024-07-29

## 2024-07-29 NOTE — PSYCH
PSYCHIATRIC EVALUATION     [facility] - [facility]    Name and Date of Birth:  Gigi Scott 19 y.o. 2005 MRN: [id number]    Date of Visit: July 29, 2024    Source of Information: Patient himself who seems to be good historian.  His medical records including his PCP notes were also reviewed.    Chief Complaint: Management of ADHD.  Concerned about having possible anxiety, depression and autism.    HPI: This is a 19-year-old  male, never , no children, currently is a student at [employer].  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  The patient reports being diagnosed with ADHD when he was in eighth grade by psychiatric nurse practitioner.  Reports has been in treatment on and off since then.  Recently was following with telehealth psychiatrist and being prescribed Concerta.  Reports last saw them in April this year.  Currently takes Concerta ER 36 mg daily.  Reports in the past has been on Adderall but had side effects including acid reflux and strong body odor and urine.  Denies being diagnosed with any other mental health disorder in the past.  Has been also in therapy few times in the past with recently following with counseling center at [employer].  Denies any psychiatric inpatient admission in the past.  Patient came in today for initial psychiatric evaluation.    The patient reports one of his therapist had told he might have anxiety disorder and wanted him to be on medication for it.  He also reports concern of having possible depression and autism though never has been diagnosed or treated in the past.    The patient reports he was diagnosed with ADHD when he was in eighth grade.  Reports he saw his child psychiatrist nurse practitioner at that time.  He reports he had multiple follow-ups with the practitioner along with getting collateral information from his parents and teachers before they diagnosed  him with ADHD.  The patient reports his main struggle has been inattentiveness but also had some hyperactive behavior when he was young going back to third grade.  He reports he remembers being very fidgety always moving around tapping fingers and feet most of the time, zoning out at times reports would get frequent skilled nursing because of his behavior.  He also reports he would struggle with completing his homework in time.  He reports his parents would feel that he would zone out when doing any activities such as reading at home/school.  Presently he reports his attention has been better since being on Concerta but not perfect.  Still has some difficulty keeping focused on 1 task.  Able to pay attention better but still can get distracted at times.  Still has significant difficulty keeping up with deadlines.  He still reports losing or misplacing things easily.  1-1 communication is good without any significant distractibility.  Reports takes Concerta most of the days of the week.    Reports anxiety is mostly situational if there is any stressor or trigger.  Reports his anxiety lasted longer when he was diagnosed with Crohn's disease but is able to cope with it well now.  Has not affected his day-to-day activity or any effect on academic or work.  Denies any history of worrying all the time being nervous or fearful.  Denies any history of panic attack.  Denies any history of social anxiety.  Denies any history of symptoms or obsessive-compulsive disorder.  Denies any history of any trauma or abuse In the past.    The patient reports he started feeling more depressed since 2022 after he was diagnosed with Crohn's disease.  He reports before the diagnosis he might have a day or 2 where he might not feel like doing anything or have any interest or motivation.  No major depressive episode.  Though he reports since then he would have episodes which can last from a week to 2 where he will experience low mood, not motivated  to do much, have difficulty initiating task at times, not having interest in doing anything low energy level and not feeling good.  He reports his sleep and appetite though has been fine.  Denies any history of any major struggle with sleep or appetite ever in the past.  He reports his last major depressive episode was in November where he experienced above-mentioned symptoms for up to 2 weeks.  He though feels this mostly only related with diagnosis of Crohn disease.  He feels some of his symptoms for Crohn disease such as low energy might be overlapping with depressive symptoms.  Denies any history of any self-harming behavior or suicidal ideation but reports last November when he was having a major depressive episode had passive death wish though no intent or plan to do anything.  Denies any access to firearms.  Denies any struggle except when he was teenager with anger issue.  Denies any history of physical aggression or homicidal ideation.  Denies any history of breaking or throwing things when he was upset  when he was teenager.    Denies any history of any manic or hypomanic episode denies any history of auditory or visual hallucination.  Does not endorse any history of paranoia or delusional ideation.    The patient was concerned about having possible autism.  He reports he has read about it and also have neighbor who was diagnosed with autism.  He feels he has symptoms in regard to it.  Described having difficulty making friends throughout his life.  When explored in detail he reports he did not had any difficulty making friends but he was never able to maintain the friendship as he would not keep in touch with them.  He also reports having some difficulty understanding nonverbal cues in the past but could not recall much about it when asked about example for it.  Denies any history of any language issues or impairment.  Reports has some repetitive movements such as tapping movement of his finger feet and  occasionally he will whistle when he is thinking about something.  He denies any struggles with school or college.  Did very well on the tests but reports major difficulty with doing homework within the time frame.  Denies any other symptoms of autism.    Denies any other concerns today.      Review Of Systems: Negative other than mentioned above    Constitutional negative   ENT negative   Cardiovascular negative   Respiratory negative   Gastrointestinal negative   Genitourinary negative   Musculoskeletal negative   Integumentary negative   Neurological negative   Endocrine negative   Other Symptoms none     Current Rating Scores:     Current PHQ-9   PHQ-2/9 Depression Screening    Little interest or pleasure in doing things: 0 - not at all  Feeling down, depressed, or hopeless: 0 - not at all  Trouble falling or staying asleep, or sleeping too much: 0 - not at all  Feeling tired or having little energy: 1 - several days  Poor appetite or overeatin - several days  Feeling bad about yourself - or that you are a failure or have let yourself or your family down: 0 - not at all  Trouble concentrating on things, such as reading the newspaper or watching television: 1 - several days  Moving or speaking so slowly that other people could have noticed. Or the opposite - being so fidgety or restless that you have been moving around a lot more than usual: 0 - not at all       Current NINA-7 is .      Past Psychiatric History: Check HPI for details.        Traumatic History:     Abuse: none  Other Traumatic Events: none       Substance Abuse History: Patient reports drinking alcohol a few times a year.  Denies any history of alcohol abuse.  Denies any history of substance use.          Longest clean time: not applicable  History of Inpatient/Outpatient rehabilitation program: no  Smoking history: denies use  Use of caffeine: denies use    Family Psychiatric/Substance Use History:     Psychiatric Illness:  Mother - ADHD, Aunt  - Alzheimer disease  Substance Abuse:  patient denies  Suicide Attempts:  patient denies    Social History:  Born & Raised in : [place]  Childhood Experiences: All right  Education:  Currently in college doing bachelor's degree from [employer]  Learning Disabilities: ADHD history  Marital History: single  Children: none  Living Arrangement: 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  Though when college in session lives in [place]  Occupational History: works part-time  Functioning Relationships: good support system  Legal History: none   History: None      Suicide/Homicide Risk Assessment:    Risk of Harm to Self:  The following ratings are based on assessment at the time of the interview  Demographic risk factors include: , male, age: young adult (15-24)  Historical Risk Factors include: history of depression  Recent Specific Risk Factors include:  Diagnosis of ADHD  Protective Factors: no current suicidal ideation, access to mental health treatment, compliant with medications, compliant with mental health treatment, stable living environment, supportive parents, supportive friends  Weapons: none and no firearms. The following steps have been taken to ensure weapons are properly secured: not applicable  Based on today's assessmentGigi presents the following risk of harm to self: minimal    Risk of Harm to Others:  The following ratings are based on assessment at the time of the interview  Based on today's assessment, Gigi presents the following risk of harm to others: none    The following interventions are recommended: no intervention changes needed    Past Medical History:    Past Medical History:   Diagnosis Date    Acne     Crohn disease (HCC)         Past Surgical History:   Procedure Laterality Date    NOSE SURGERY       No Known Allergies      Current Medications:      Current Outpatient Medications:     methylphenidate (CONCERTA) 36 MG ER tablet, Take 1  tablet (36 mg total) by mouth daily Max Daily Amount: 36 mg, Disp: 30 tablet, Rfl: 0       OBJECTIVE:    Vital signs in last 24 hours:    There were no vitals filed for this visit.    Mental Status Evaluation:    Appearance age appropriate, casually dressed   Behavior cooperative, calm   Speech normal rate, normal volume, normal pitch   Mood normal   Affect normal range and intensity, appropriate   Thought Processes organized, goal directed   Associations intact associations   Thought Content no overt delusions   Perceptual Disturbances: no auditory hallucinations, no visual hallucinations   Abnormal Thoughts  Risk Potential Suicidal ideation - None  Homicidal ideation - None  Potential for aggression - No   Orientation oriented to person, place, time/date, and situation   Memory recent and remote memory grossly intact   Consciousness alert and awake   Attention Span Concentration Span attention span and concentration are age appropriate   Intellect appears to be of average intelligence   Insight intact   Judgement intact   Muscle Strength and  Gait normal muscle strength and normal muscle tone, normal gait and normal balance   Motor Activity no abnormal movements   Language no difficulty naming common objects, no difficulty repeating a phrase, no difficulty writing a sentence   Fund of Knowledge adequate knowledge of current events  adequate fund of knowledge regarding past history  adequate fund of knowledge regarding vocabulary    Pain none   Pain Scale 0       Laboratory Results: Most Recent Labs:   Lab Results   Component Value Date    SODIUM 140 09/27/2022    K 3.9 09/27/2022     09/27/2022    CO2 28 09/27/2022    BUN 9 09/27/2022    CREATININE 0.67 09/27/2022    CALCIUM 8.1 (L) 09/27/2022    AST 10 09/27/2022    ALT 24 09/27/2022    ALKPHOS 65 09/27/2022    TP 6.5 09/27/2022    TBILI 0.2 09/27/2022       Assessment/Plan: Based on the patient evaluation today and review of his past psychiatric history, I  at this time tentatively believe the patient meets the criteria for ADHD inattentive type and unspecified depressive disorder.  Though the patient was recommended to undergo neuropsychological evaluation in the near future to confirm ADHD.  Will also help the patient to confirm or rule out autism.  Based on patient evaluation at this time is not very clear if the patient is meeting the criteria for autism though does endorse few traits for it.  He also does not seem to struggle with any anxiety disorder as it seems mostly situational anxiety but also able to cope with it well.  Denied any effect on his occupational, personal or academic life in the past.  The patient does endorse depressive symptoms and episodes since he was diagnosed with Crohn's disease.  Though at this time he does not seems to be meeting the full criteria for it.  We will continue the evaluation in the near future over follow-up visits to confirm or rule out specified depressive disorder.  Does not seem to meet the criteria for any other mental health diagnosis.  Does not endorse any history of alcohol or substance use.  Has a good support system from his parents.  No history of suicide attempt or ideation.  No history of HI or physical aggression.    Plan: At this time I will continue with the Concerta ER 36 mg daily for his ADHD.  If needed in the future the dose can be increased.  Presently the patient denies any significant depression and rates it at 1-2 on a scale of 0-10 with 10 being the worst.  Due to which I will hold starting any medication for it.  The patient was advised in case in the near future he started struggling with depressive episode again then SSRI should be the first line of treatment.  I will also refer the patient for neuropsychological testing at next visit to confirm or rule out ADHD and autism.  The patient educated about his medication Concerta in detail including benefit, risk, side effect, alternative,  contraindication, dosage and frequency.  He denies any history of cardiac issue, history of seizure, any eating disorder or any other contradiction to prescribe stimulants.  The patient was advised in case he has any concern to call us and to call crisis, 911 or visit nearby ER in case of any emergency or having SI or HI.  Patient verbalized understanding and agrees with the plan.  He will follow up with me in 4 weeks or sooner if needed.     Diagnoses and all orders for this visit:    Attention deficit hyperactivity disorder, inattentive type  -     methylphenidate (CONCERTA) 36 MG ER tablet; Take 1 tablet (36 mg total) by mouth daily Max Daily Amount: 36 mg    Depression, unspecified depression type          Treatment Recommendations:    Check Assessment/Plan section for details.    Risks/Benefits/Precautions:      Risks, Benefits And Possible Side Effects Of Medications:    Risks, benefits, and possible side effects of medications explained to Gigi and he verbalizes understanding and agreement for treatment.    Controlled Medication Discussion:     Gigi has been filling controlled prescriptions on time as prescribed according to [place] Prescription Drug Monitoring Program    Treatment Plan;    Completed and signed during the session: Yes - with Gigi    Visit Time    Visit Start Time: 10:09 PM  Visit Stop Time: 11:10 AM  Total Visit Duration:  61 minutes    Declan Hua MD 07/29/24

## 2024-07-29 NOTE — BH TREATMENT PLAN
Chief complaint:   Chief Complaint   Patient presents with    Ear Pain     Left ear       Vitals:  Visit Vitals  Pulse 96   Temp 96.9 °F (36.1 °C) (Tympanic)   Resp 26   Wt 19.7 kg (43 lb 6.4 oz)   SpO2 97%       HISTORY OF PRESENT ILLNESS     3-year-old presents with mom for complaints of left ear pain that mom states started last evening.  Child has had no fevers.  Does note no significant URI symptoms but has had mild cough/runny nose.  Denies any sore throat.  She is given her nothing for symptoms today.  No other aggravating or alleviating factors are present .        Other significant problems:  There are no problems to display for this patient.      PAST MEDICAL, FAMILY AND SOCIAL HISTORY     Medications:  Current Outpatient Medications   Medication Sig Dispense Refill    amoxicillin (AMOXIL) 400 MG/5ML suspension Take 10.9 mLs by mouth in the morning and 10.9 mLs in the evening. Do all this for 10 days. 225 mL 0     No current facility-administered medications for this visit.       Allergies:  ALLERGIES:  No Known Allergies    Past Medical  History/Surgeries:  History reviewed. No pertinent past medical history.    History reviewed. No pertinent surgical history.    Family History:  No family history on file.    Social History:  Social History     Tobacco Use    Smoking status: Never    Smokeless tobacco: Never   Substance Use Topics    Alcohol use: Not on file       REVIEW OF SYSTEMS     Review of Systems   Constitutional:  Negative for fever.   HENT:  Positive for congestion and ear pain.        PHYSICAL EXAM     Physical Exam  Vitals and nursing note reviewed.   Constitutional:       General: She is active. She is not in acute distress.     Appearance: Normal appearance. She is well-developed.   HENT:      Head: Normocephalic.      Right Ear: Tympanic membrane and external ear normal.      Left Ear: External ear normal. Tympanic membrane is erythematous and bulging.      Nose: Nose normal.       TREATMENT PLAN (Medication Management Only)        [facility] - [facility]    Name and Date of Birth:  Gigi Scott 19 y.o. 2005  Date of Treatment Plan: July 29, 2024  Diagnosis/Diagnoses:    1. Attention deficit hyperactivity disorder, inattentive type    2. Depression, unspecified depression type      Strengths/Personal Resources for Self-Care: supportive family, supportive friends, taking medications as prescribed, ability to adapt to life changes, ability to communicate needs, ability to communicate well, ability to listen, ability to reason, average or above intelligence.  Area/Areas of need (in own words): depression, ADHD symptoms  1. Long Term Goal: Feel better about self.  Target Date:6 months - 1/29/2025  Person/Persons responsible for completion of goal: Gigi  2.  Short Term Objective (s) - How will we reach this goal?:   A. Provider new recommended medication/dosage changes and/or continue medication(s): continue current medications as prescribed.  B. N/A.  C. N/A.  Target Date:6 months - 1/29/2025  Person/Persons Responsible for Completion of Goal: Gigi  Progress Towards Goals: continuing treatment  Treatment Modality: medication management every 4 weeks  Review due 180 days from date of this plan: 6 months - 1/29/2025  Expected length of service: ongoing treatment  My Physician/PA/NP and I have developed this plan together and I agree to work on the goals and objectives. I understand the treatment goals that were developed for my treatment.       Mouth/Throat:      Mouth: Mucous membranes are moist.      Pharynx: Posterior oropharyngeal erythema present. No oropharyngeal exudate.      Comments: Uvula is midline.  No peritonsillar abscess     Neck: Normal range of motion.   Eyes:      Pupils: Pupils are equal, round, and reactive to light.   Cardiovascular:      Rate and Rhythm: Normal rate.      Pulses: Normal pulses.   Pulmonary:      Effort: Pulmonary effort is normal.      Breath sounds: Normal breath sounds.   Abdominal:      General: Abdomen is flat.      Palpations: Abdomen is soft.   Musculoskeletal:         General: Normal range of motion.   Skin:     General: Skin is warm.      Capillary Refill: Capillary refill takes less than 2 seconds.   Neurological:      General: No focal deficit present.      Mental Status: She is alert.   Psychiatric:         Mood and Affect: Mood normal.         Behavior: Behavior normal.         Thought Content: Thought content normal.         Judgment: Judgment normal.         ASSESSMENT/PLAN     3-year-old here with nontoxic appearance and normal vital signs for left ear pain.  No real URI symptoms at this time though on exam patient does have erythema to the throat, no exudate.  She additionally does have left otitis media.  We did discuss possibility of strep given I am going to treat the ear POC strep testing was deferred.  Symptoms that would warrant further evaluation were discussed as well as watching for evolving symptoms given the throat appearance.  Supportive treatment such as Tylenol ibuprofen were reviewed.  All questions were answered and she was agreeable discharge plan  Beatrice was seen today for ear pain.    Diagnoses and all orders for this visit:    Acute suppurative otitis media of left ear without spontaneous rupture of tympanic membrane, recurrence not specified    Other orders  -     amoxicillin (AMOXIL) 400 MG/5ML suspension; Take 10.9 mLs by mouth in the morning and 10.9 mLs in the evening. Do all  this for 10 days.

## 2024-08-23 ENCOUNTER — OFFICE VISIT (OUTPATIENT)
Dept: PSYCHIATRY | Facility: CLINIC | Age: 19
End: 2024-08-23
Payer: COMMERCIAL

## 2024-08-23 DIAGNOSIS — F32.A DEPRESSION, UNSPECIFIED DEPRESSION TYPE: ICD-10-CM

## 2024-08-23 DIAGNOSIS — F90.0 ATTENTION DEFICIT HYPERACTIVITY DISORDER, INATTENTIVE TYPE: Primary | ICD-10-CM

## 2024-08-23 PROCEDURE — 99214 OFFICE O/P EST MOD 30 MIN: CPT | Performed by: PSYCHIATRY & NEUROLOGY

## 2024-08-23 RX ORDER — METHYLPHENIDATE HYDROCHLORIDE 36 MG/1
36 TABLET ORAL DAILY
Qty: 30 TABLET | Refills: 0 | Status: SHIPPED | OUTPATIENT
Start: 2024-08-23

## 2024-08-23 NOTE — PSYCH
MEDICATION MANAGEMENT NOTE        [facility]      Name and Date of Birth:  Gigi Scott 19 y.o. 2005 MRN: [id number]    Date of Visit: August 23, 2024    Reason for Visit: Follow-up for medication management    Subjective: The patient reports he overall has been doing okay since he last saw me a less than a month back.  Reports mood has been stable and denies any depressive episode.  Reports anxiety overall well-controlled.  The patient reports he lost his Concerta pill bottle almost 2 weeks back but given he was no longer working as it was summer job and school starting next week he was able to manage his symptoms well.  The patient is due for her next refill in the next 3 to 4 days.  The patient was advised about being careful with the medication given it is a scheduled medication.  The patient agreed.  Patient denies any hopelessness or having any SI or HI.  Reports looking forward to school starting next week.  Has 2 semester left.  Reports sleep, appetite, energy level has been fine.  Denies any concerns today.  Denies any medical issues lately.  The patient wanted a referral for clarification of diagnosis for ADHD and autism and referral for neuropsychological evaluation was done.    Review Of Systems:      Constitutional negative   ENT negative   Cardiovascular negative   Respiratory negative   Gastrointestinal negative   Genitourinary negative   Musculoskeletal negative   Integumentary negative   Neurological negative   Endocrine negative   Other Symptoms none         Suicide/Homicide Risk Assessment:     Risk of Harm to Self:  The following ratings are based on assessment at the time of the interview  Demographic risk factors include: , male, age: young adult (15-24)  Historical Risk Factors include: history of depression  Recent Specific Risk Factors include:  Diagnosis of ADHD  Protective Factors: no current suicidal ideation, access to  mental health treatment, compliant with medications, compliant with mental health treatment, stable living environment, supportive parents, supportive friends  Weapons: none and no firearms. The following steps have been taken to ensure weapons are properly secured: not applicable  Based on today's assessment, Gigi presents the following risk of harm to self: minimal     Risk of Harm to Others:  The following ratings are based on assessment at the time of the interview  Based on today's assessment, Gigi presents the following risk of harm to others: none     The following interventions are recommended: no intervention changes needed    Alcohol/Substance Abuse: Denies        Past Medical History:    Past Medical History:   Diagnosis Date    Acne     Crohn disease (HCC)         Past Surgical History:   Procedure Laterality Date    NOSE SURGERY       No Known Allergies    Current Medications:       Current Outpatient Medications:     methylphenidate (CONCERTA) 36 MG ER tablet, Take 1 tablet (36 mg total) by mouth daily Max Daily Amount: 36 mg, Disp: 30 tablet, Rfl: 0       History Review: The following portions of the patient's history were reviewed and updated as appropriate: allergies, current medications, past family history, past medical history, past social history, past surgical history, and problem list.         OBJECTIVE:     Vital signs in last 24 hours:    There were no vitals filed for this visit.    Mental Status Evaluation:    Appearance age appropriate, casually dressed   Behavior cooperative, calm   Speech normal rate, normal volume, normal pitch   Mood normal   Affect normal range and intensity, appropriate   Thought Processes organized, goal directed   Associations intact associations   Thought Content no overt delusions   Perceptual Disturbances: no auditory hallucinations, no visual hallucinations   Abnormal Thoughts  Risk Potential Suicidal ideation - None  Homicidal ideation -  None  Potential for aggression - No   Orientation oriented to person, place, time/date, and situation   Memory recent and remote memory grossly intact   Consciousness alert and awake   Attention Span Concentration Span attention span and concentration are age appropriate   Intellect appears to be of average intelligence   Insight intact   Judgement intact   Muscle Strength and  Gait normal gait and normal balance   Motor activity no abnormal movements   Language no difficulty naming common objects, no difficulty repeating a phrase   Fund of Knowledge adequate knowledge of current events  adequate fund of knowledge regarding past history  adequate fund of knowledge regarding vocabulary    Pain none   Pain Scale 0       Laboratory Results: Most Recent Labs:   Lab Results   Component Value Date    SODIUM 140 09/27/2022    K 3.9 09/27/2022     09/27/2022    CO2 28 09/27/2022    BUN 9 09/27/2022    CREATININE 0.67 09/27/2022    CALCIUM 8.1 (L) 09/27/2022    AST 10 09/27/2022    ALT 24 09/27/2022    ALKPHOS 65 09/27/2022    TP 6.5 09/27/2022    TBILI 0.2 09/27/2022     I have personally reviewed all pertinent laboratory/tests results.    Assessment/Plan: The patient overall has been doing well and does not endorse any major depressive episode or any significant anxiety lately.  Though he was out of medication Concerta for last 2 weeks but reports it did not affected significantly given it was not working and neither was attending school.  Will be due starting school again from next week.  Referral given for neuropsychological evaluation to clarify diagnosis for ADHD and autism.  Plan at this time is to continue with the Concerta with no change for ADHD.  The patient will follow up with me in 3 months or sooner if needed.  He was educated about his med in detail and advised to call us if any concern and to call crisis, 911 or visit nearby ER in case of any emergency or any SI or HI.  Patient verbalized understanding  and agrees with the plan.      Diagnoses and all orders for this visit:    Attention deficit hyperactivity disorder, inattentive type  -     Ambulatory Referral to Neuropsychology; Future  -     methylphenidate (CONCERTA) 36 MG ER tablet; Take 1 tablet (36 mg total) by mouth daily Max Daily Amount: 36 mg    Depression, unspecified depression type          Treatment Recommendations/Precautions:      Aware of 24 hour and weekend coverage for urgent situations accessed by calling [facility] main practice number    Risks/Benefits      Risks, Benefits And Possible Side Effects Of Medications:    Risks, benefits, and possible side effects of medications explained to Gigi and he verbalizes understanding and agreement for treatment.    Controlled Medication Discussion:     Gigi has been filling controlled prescriptions on time as prescribed according to [place] Prescription Drug Monitoring Program    Psychotherapy Provided:     Individual psychotherapy provided: Medications, treatment progress and treatment plan reviewed with Gigi.  Medication education provided to Gigi.  Reassurance and supportive therapy provided.   Crisis/safety plan discussed with Gigi.     Treatment Plan;    Completed and signed during the session: Not applicable - Treatment Plan not due at this session  Visit Time    Visit Start Time: 9:35 AM  Visit Stop Time: 9:50 AM  Total Visit Duration:  15 minutes      Declan Hua MD 08/23/24

## 2024-09-13 ENCOUNTER — TELEPHONE (OUTPATIENT)
Age: 19
End: 2024-09-13

## 2024-09-13 NOTE — TELEPHONE ENCOUNTER
Gigi states that Dr Hua [facility] He states that they told him it needs to be faxed to [facility] by the [facility] Office.

## 2024-09-26 DIAGNOSIS — F90.0 ATTENTION DEFICIT HYPERACTIVITY DISORDER, INATTENTIVE TYPE: ICD-10-CM

## 2024-09-26 NOTE — TELEPHONE ENCOUNTER
Reason for call:   [x] Refill   [] Prior Auth  [] Other:     Office:   [] PCP/Provider -   [x] Specialty/Provider - [facility] PETEY Hua MD     Medication: methylphenidate (CONCERTA) 36 MG ER tablet     Dose/Frequency: Take 1 tablet (36 mg total) by mouth daily Max Daily Amount: 36 mg,     Quantity: 30 tablets    Pharmacy: [facility]/pharmacy #[id number] - [place], [place] - [address] [place] [address] [phone]    Does the patient have enough for 3 days?   [] Yes   [x] No - Send as HP to POD

## 2024-09-27 RX ORDER — METHYLPHENIDATE HYDROCHLORIDE 36 MG/1
36 TABLET ORAL DAILY
Qty: 30 TABLET | Refills: 0 | Status: SHIPPED | OUTPATIENT
Start: 2024-09-27

## 2024-10-14 ENCOUNTER — TELEPHONE (OUTPATIENT)
Age: 19
End: 2024-10-14

## 2024-11-06 ENCOUNTER — OFFICE VISIT (OUTPATIENT)
Dept: INTERNAL MEDICINE CLINIC | Facility: CLINIC | Age: 19
End: 2024-11-06
Payer: COMMERCIAL

## 2024-11-06 VITALS — SYSTOLIC BLOOD PRESSURE: 120 MMHG | OXYGEN SATURATION: 94 % | DIASTOLIC BLOOD PRESSURE: 80 MMHG | HEART RATE: 79 BPM

## 2024-11-06 DIAGNOSIS — K50.80 CROHN'S DISEASE OF BOTH SMALL AND LARGE INTESTINE WITHOUT COMPLICATION (HCC): Primary | ICD-10-CM

## 2024-11-06 DIAGNOSIS — J34.2 DEVIATED SEPTUM: ICD-10-CM

## 2024-11-06 DIAGNOSIS — F90.0 ATTENTION DEFICIT HYPERACTIVITY DISORDER, INATTENTIVE TYPE: ICD-10-CM

## 2024-11-06 PROCEDURE — 99214 OFFICE O/P EST MOD 30 MIN: CPT | Performed by: INTERNAL MEDICINE

## 2024-11-06 NOTE — ASSESSMENT & PLAN NOTE
-Improved with medication when the patient remembers to take his methylphenidate  -Appreciate psychiatry follow-up

## 2024-11-06 NOTE — ASSESSMENT & PLAN NOTE
-Followed by GI.  Scheduled to see them in January 2025.  He has discussed Biologics with them but has told them he prefers to wait until he finishes school to initiate treatment.

## 2024-11-06 NOTE — ASSESSMENT & PLAN NOTE
-Broken nose while wrestling. Surgically corrected by ENT. Has difficulty breathing out of left nostril (chronic). Patient states he will let me know if he wants a referral to see ENT.

## 2024-11-06 NOTE — PROGRESS NOTES
Name: Gigi Scott      : 2005      MRN: [id number]  Encounter Provider: Jude Alegria MD  Encounter Date: 2024   Encounter department: [facility]    Assessment & Plan     1. Crohn's disease of both small and large intestine without complication (HCC)  Assessment & Plan:  -Followed by GI.  Scheduled to see them in 2025.  He has discussed Biologics with them but has told them he prefers to wait until he finishes school to initiate treatment.  2. Attention deficit hyperactivity disorder, inattentive type  Assessment & Plan:  -Improved with medication when the patient remembers to take his methylphenidate  -Appreciate psychiatry follow-up  3. Deviated septum  Assessment & Plan:  -Broken nose while wrestling. Surgically corrected by ENT. Has difficulty breathing out of left nostril (chronic). Patient states he will let me know if he wants a referral to see ENT.          Subjective      HPI  Patient presents today for chronic f/u. His history is notable for Crohn's disease and ADHD. Overall, he states his Crohn's has been well controlled up until yesterday. He states he was in line for several hours. He reports he was given some while waiting and feels it may ave triggered his symptoms. He reports since he has been experiencing left lower abdominal pain, cramping and diarrhea.     Regarding his ADHD he reports it has been helpful when he remembers to take it consistently. He is currently followed by Psychiatrist every 3 months.     All other systems negative except for pertinent findings noted in HPI.       Current Outpatient Medications on File Prior to Visit   Medication Sig    methylphenidate (CONCERTA) 36 MG ER tablet Take 1 tablet (36 mg total) by mouth daily Max Daily Amount: 36 mg    [DISCONTINUED] bisacodyl (DULCOLAX) 5 mg EC tablet Take 4 tablets (20 mg total) by mouth once for 1 dose    [DISCONTINUED] polyethylene glycol (GOLYTELY) 4000 mL solution Take  4,000 mL by mouth once for 1 dose       Objective     /80   Pulse 79   SpO2 94%     BP Readings from Last 3 Encounters:   11/06/24 120/80   04/18/24 120/64   03/07/24 110/70        Wt Readings from Last 3 Encounters:   04/18/24 93 kg (205 lb)   03/07/24 88.5 kg (195 lb)   12/06/23 88.9 kg (196 lb)       Physical Exam    General: NAD  HEENT: NCAT, EOMI, normal conjunctiva  Cardiovascular: RRR, normal S1 and S2, no m/r/g  Pulmonary: Normal respiratory effort, no wheezes, rales or rhonchi  GI: Mild tenderness to palpation in left lower and right lower quadrants  MSK: Normal bulk and tone  Skin: Normal skin color, no rashes     Jude Alegria MD

## 2024-11-22 ENCOUNTER — TELEPHONE (OUTPATIENT)
Age: 19
End: 2024-11-22

## 2024-11-22 NOTE — TELEPHONE ENCOUNTER
Pt called to cancel today 11/22/24 at 9:30 am with Dr Hua because he will not drive in this weather. Pt rescheduled for 11/26/24 at 4:00 pm.

## 2024-11-29 ENCOUNTER — TELEPHONE (OUTPATIENT)
Dept: PSYCHIATRY | Facility: CLINIC | Age: 19
End: 2024-11-29

## 2024-12-02 ENCOUNTER — TELEMEDICINE (OUTPATIENT)
Dept: PSYCHIATRY | Facility: CLINIC | Age: 19
End: 2024-12-02
Payer: COMMERCIAL

## 2024-12-02 DIAGNOSIS — F90.0 ATTENTION DEFICIT HYPERACTIVITY DISORDER, INATTENTIVE TYPE: Primary | ICD-10-CM

## 2024-12-02 PROCEDURE — 99213 OFFICE O/P EST LOW 20 MIN: CPT | Performed by: PSYCHIATRY & NEUROLOGY

## 2024-12-02 NOTE — PSYCH
Virtual Regular Visit    Verification of patient location:    Patient is located at Home in the following state in which I hold an active license [place]      Assessment/Plan:    Problem List Items Addressed This Visit       Attention deficit hyperactivity disorder, inattentive type - Primary       Goals addressed in session: Goal 1          Reason for visit is   Chief Complaint   Patient presents with    Virtual Regular Visit          Encounter provider Declan Hua MD      Recent Visits  Date Type Provider Dept   11/29/24 Telephone Declan [facility] MD [facility]   Showing recent visits within past 7 days and meeting all other requirements  Today's Visits  Date Type Provider Dept   12/02/24 Telemedicine Declan Hua MD [facility] [place]   Showing today's visits and meeting all other requirements  Future Appointments  No visits were found meeting these conditions.  Showing future appointments within next 150 days and meeting all other requirements       The patient was identified by name and date of birth. Gigi Scott was informed that this is a telemedicine visit and that the visit is being conducted throughthe Epic Embedded platform. He agrees to proceed..  My office door was closed. No one else was in the room.  He acknowledged consent and understanding of privacy and security of the video platform. The patient has agreed to participate and understands they can discontinue the visit at any time.    Patient is aware this is a billable service.     Subjective  See below      HPI     Past Medical History:   Diagnosis Date    Acne     Broken nose     Crohn disease (HCC)     Deviated septum        Past Surgical History:   Procedure Laterality Date    NOSE SURGERY         Current Outpatient Medications   Medication Sig Dispense Refill    methylphenidate (CONCERTA) 36 MG ER tablet Take 1 tablet (36 mg total) by mouth daily Max Daily Amount: 36 mg 30 tablet 0     No  current facility-administered medications for this visit.        No Known Allergies    Review of Systems    Video Exam    There were no vitals filed for this visit.    Physical Exam     Visit Time    Visit Start Time: 1:39 PM  Visit Stop Time: 1:50 PM  Total Visit Duration:  11 minutes      MEDICATION MANAGEMENT NOTE        [facility]      Name and Date of Birth:  Gigi Scott 19 y.o. 2005 MRN: [id number]    Date of Visit: December 2, 2024      Assessment & Plan  Attention deficit hyperactivity disorder, inattentive type        Reason for Visit: Follow-up for medication management    Subjective: The patient reports he overall has been doing well.  Reports some struggles with poor attention but also reports had not been taking Concerta regularly.   he reports he ran out of the prescription couple of weeks back and was not taking till 5 days back when it was refilled.  He reports he otherwise has been doing well.  He is back at school after Thanksgiving weekend.  Reports mood has been stable and denies feeling depressed or anxious.  Denies any irritability or mood swings.  Denies any SI or HI.  Reports sleep, appetite, energy level has been fine.  Denies any medical issues.  The patient has appointment for neuropsychological evaluation at the [place] [facility] later this month.  The patient was advised to stop taking Concerta at least 5 to 10 days before the test.  Patient denies any other concerns.  denies any acute medical issues.  Denies any side effects from Concerta.  Denies any chest pain, chest discomfort, palpitation.      Review Of Systems: Negative      Constitutional negative   ENT negative   Cardiovascular negative   Respiratory negative   Gastrointestinal negative   Genitourinary negative   Musculoskeletal negative   Integumentary negative   Neurological negative   Endocrine negative   Other Symptoms none       Suicide/Homicide Risk  Assessment:     Risk of Harm to Self:  The following ratings are based on assessment at the time of the interview  Demographic risk factors include: , male, age: young adult (15-24)  Historical Risk Factors include: history of depression  Recent Specific Risk Factors include:  Diagnosis of ADHD  Protective Factors: no current suicidal ideation, access to mental health treatment, compliant with medications, compliant with mental health treatment, stable living environment, supportive parents, supportive friends  Weapons: none and no firearms. The following steps have been taken to ensure weapons are properly secured: not applicable  Based on today's assessment, Gigi presents the following risk of harm to self: minimal     Risk of Harm to Others:  The following ratings are based on assessment at the time of the interview  Based on today's assessment, Gigi presents the following risk of harm to others: none     The following interventions are recommended: no intervention changes needed    Alcohol/Substance Abuse: Denies        Past Medical History:    Past Medical History:   Diagnosis Date    Acne     Broken nose     Crohn disease (HCC)     Deviated septum         Past Surgical History:   Procedure Laterality Date    NOSE SURGERY       No Known Allergies    Current Medications:       Current Outpatient Medications:     methylphenidate (CONCERTA) 36 MG ER tablet, Take 1 tablet (36 mg total) by mouth daily Max Daily Amount: 36 mg, Disp: 30 tablet, Rfl: 0       History Review: The following portions of the patient's history were reviewed and updated as appropriate: allergies, current medications, past family history, past medical history, past social history, past surgical history, and problem list.         OBJECTIVE:     Vital signs in last 24 hours:    There were no vitals filed for this visit.    Mental Status Evaluation:    Appearance age appropriate, casually dressed   Behavior cooperative, calm    Speech normal rate, normal volume, normal pitch   Mood normal   Affect blunted   Thought Processes organized, goal directed   Associations intact associations   Thought Content no overt delusions   Perceptual Disturbances: no auditory hallucinations, no visual hallucinations   Abnormal Thoughts  Risk Potential Suicidal ideation - None  Homicidal ideation - None  Potential for aggression - No   Orientation oriented to person, place, time/date, and situation   Memory recent and remote memory grossly intact   Consciousness alert and awake   Attention Span Concentration Span attention span and concentration are age appropriate   Intellect appears to be of average intelligence   Insight intact   Judgement intact   Muscle Strength and  Gait unable to assess today due to virtual visit   Motor activity unable to assess today due to virtual visit   Language no difficulty naming common objects, no difficulty repeating a phrase   Fund of Knowledge adequate knowledge of current events  adequate fund of knowledge regarding past history  adequate fund of knowledge regarding vocabulary    Pain none   Pain Scale 0       Laboratory Results: Most Recent Labs:   Lab Results   Component Value Date    SODIUM 140 09/27/2022    K 3.9 09/27/2022     09/27/2022    CO2 28 09/27/2022    BUN 9 09/27/2022    CREATININE 0.67 09/27/2022    CALCIUM 8.1 (L) 09/27/2022    AST 10 09/27/2022    ALT 24 09/27/2022    ALKPHOS 65 09/27/2022    TP 6.5 09/27/2022    TBILI 0.2 09/27/2022     I have personally reviewed all pertinent laboratory/tests results.    Assessment/Plan: Patient overall reports doing well and denies any significant concern.  Has neuropsychological evaluation to clarify the diagnosis for ADHD and autism later this month.  Plan is to continue with Concerta with no changes in the dose and schedule.  He was again educated about the medication in detail and advised to call us if any concern and to call [facility], 911 or visit nearby ER  in case of any emergency.  The patient agrees.      Diagnoses and all orders for this visit:    Attention deficit hyperactivity disorder, inattentive type          Treatment Recommendations/Precautions:      Aware of 24 hour and weekend coverage for urgent situations accessed by calling [facility] main practice number    Risks/Benefits      Risks, Benefits And Possible Side Effects Of Medications:    Risks, benefits, and possible side effects of medications explained to Iggi and he verbalizes understanding and agreement for treatment.    Controlled Medication Discussion:     Gigi has been filling controlled prescriptions on time as prescribed according to [place] Prescription Drug Monitoring Program    Psychotherapy Provided:     Individual psychotherapy provided: Medications, treatment progress and treatment plan reviewed with Gigi.  Medication education provided to Gigi.  Supportive therapy provided.   Crisis/safety plan discussed with Gigi.     Treatment Plan;    Completed and signed during the session: Not applicable - Treatment Plan not due at this session      Declan Hua MD 12/02/24

## 2024-12-02 NOTE — PSYCH
Virtual Regular Visit    Verification of patient location:    Patient is located at {[facility] Virtual Patient Location:[address]} in the following state in which I hold an active license {[facility] virtual patient location:[address]}      Assessment/Plan:    Problem List Items Addressed This Visit     Attention deficit hyperactivity disorder, inattentive type - Primary       Goals addressed in session: {GOALS:11248}          Reason for visit is   Chief Complaint   Patient presents with   • Virtual Regular Visit          Encounter provider Declan Hua MD      Recent Visits  Date Type Provider Dept   11/29/24 Telephone Declan Hua MD [facility] [place]   Showing recent visits within past 7 days and meeting all other requirements  Today's Visits  Date Type Provider Dept   12/02/24 Telemedicine Declan Hua MD  [facility] [place]   Showing today's visits and meeting all other requirements  Future Appointments  No visits were found meeting these conditions.  Showing future appointments within next 150 days and meeting all other requirements       The patient was identified by name and date of birth. Gigi Scott was informed that this is a telemedicine visit and that the visit is being conducted through{[facility] VIRTUAL VISIT MEDIUM:02811}.  {Telemedicine confidentiality :47133} {Telemedicine participants:31370}  He acknowledged consent and understanding of privacy and security of the video platform. The patient has agreed to participate and understands they can discontinue the visit at any time.    Patient is aware this is a billable service.     Subjective  Gigi Scott is a 19 y.o. male *** .      HPI     Past Medical History:   Diagnosis Date   • Acne    • Broken nose    • Crohn disease (HCC)    • Deviated septum        Past Surgical History:   Procedure Laterality Date   • NOSE SURGERY         Current Outpatient Medications   Medication Sig Dispense Refill   • methylphenidate  (CONCERTA) 36 MG ER tablet Take 1 tablet (36 mg total) by mouth daily Max Daily Amount: 36 mg 30 tablet 0     No current facility-administered medications for this visit.        No Known Allergies    Review of Systems    Video Exam    There were no vitals filed for this visit.    Physical Exam     Visit Time    Visit Start Time: ***  Visit Stop Time: ***  Total Visit Duration: {Psych Total Visit Time:69105}

## 2024-12-02 NOTE — PSYCH
Virtual Regular Visit    Verification of patient location:    Patient is located at {[facility] Virtual Patient Location:[address]} in the following state in which I hold an active license {[facility] virtual patient location:[address]83}      Assessment/Plan:    Problem List Items Addressed This Visit     Attention deficit hyperactivity disorder, inattentive type - Primary       Goals addressed in session: {GOALS:57404}          Reason for visit is   Chief Complaint   Patient presents with   • Virtual Regular Visit          Encounter provider Declan Hua MD      Recent Visits  Date Type Provider Dept   11/29/24 Telephone Declan Hua MD [facility] [place]   Showing recent visits within past 7 days and meeting all other requirements  Today's Visits  Date Type Provider Dept   12/02/24 Telemedicine Decl[facility] MD  [facility] [place]   Showing today's visits and meeting all other requirements  Future Appointments  No visits were found meeting these conditions.  Showing future appointments within next 150 days and meeting all other requirements       The patient was identified by name and date of birth. Gigi Scott was informed that this is a telemedicine visit and that the visit is being conducted through{[facility] VIRTUAL VISIT MEDIUM:00042}.  {Telemedicine confidentiality :70905} {Telemedicine participants:01435}  He acknowledged consent and understanding of privacy and security of the video platform. The patient has agreed to participate and understands they can discontinue the visit at any time.    Patient is aware this is a billable service.     Subjective  Gigi Scott is a 19 y.o. male *** .      HPI     Past Medical History:   Diagnosis Date   • Acne    • Broken nose    • Crohn disease (HCC)    • Deviated septum        Past Surgical History:   Procedure Laterality Date   • NOSE SURGERY         Current Outpatient Medications   Medication Sig Dispense Refill   • methylphenidate  (CONCERTA) 36 MG ER tablet Take 1 tablet (36 mg total) by mouth daily Max Daily Amount: 36 mg 30 tablet 0     No current facility-administered medications for this visit.        No Known Allergies    Review of Systems    Video Exam    There were no vitals filed for this visit.    Physical Exam     Visit Time    Visit Start Time: ***  Visit Stop Time: ***  Total Visit Duration: {Psych Total Visit Time:06409}

## 2024-12-02 NOTE — PSYCH
Virtual Regular Visit    Verification of patient location:    Patient is located at {[facility] Virtual Patient Location:28570} in the following state in which I hold an active license {[facility] virtual patient location:[address]81}      Assessment/Plan:    Problem List Items Addressed This Visit     Attention deficit hyperactivity disorder, inattentive type - Primary       Goals addressed in session: {GOALS:57257}          Reason for visit is   Chief Complaint   Patient presents with   • Virtual Regular Visit          Encounter provider Declan Hua MD      Recent Visits  Date Type Provider Dept   11/29/24 Telephone Declan Hua MD [facility] [place]   Showing recent visits within past 7 days and meeting all other requirements  Today's Visits  Date Type Provider Dept   12/02/24 Telemedicine Declan Hua MD  [facility]   Showing today's visits and meeting all other requirements  Future Appointments  No visits were found meeting these conditions.  Showing future appointments within next 150 days and meeting all other requirements       The patient was identified by name and date of birth. Gigi Scott was informed that this is a telemedicine visit and that the visit is being conducted through{[facility] VIRTUAL VISIT MEDIUM:59987}.  {Telemedicine confidentiality :51679} {Telemedicine participants:64222}  He acknowledged consent and understanding of privacy and security of the video platform. The patient has agreed to participate and understands they can discontinue the visit at any time.    Patient is aware this is a billable service.     Subjective  Gigi Scott is a 19 y.o. male *** .      HPI     Past Medical History:   Diagnosis Date   • Acne    • Broken nose    • Crohn disease (HCC)    • Deviated septum        Past Surgical History:   Procedure Laterality Date   • NOSE SURGERY         Current Outpatient Medications   Medication Sig Dispense Refill   • methylphenidate  (CONCERTA) 36 MG ER tablet Take 1 tablet (36 mg total) by mouth daily Max Daily Amount: 36 mg 30 tablet 0     No current facility-administered medications for this visit.        No Known Allergies    Review of Systems    Video Exam    There were no vitals filed for this visit.    Physical Exam     Visit Time    Visit Start Time: ***  Visit Stop Time: ***  Total Visit Duration: {Psych Total Visit Time:62698}

## 2024-12-31 ENCOUNTER — TELEPHONE (OUTPATIENT)
Age: 19
End: 2024-12-31

## 2024-12-31 NOTE — TELEPHONE ENCOUNTER
Contacted patient off of Talk Therapy  to verify needs of services in attempts to offer patient an appointment. spoke with patient whom stated is interested in talk therapy. Pt is scheduled with Donnie Gong on 4/2/25 at 10am for talk therapy. Pt is an established patient of Dr. Hua for med management.

## 2025-03-03 ENCOUNTER — TELEPHONE (OUTPATIENT)
Age: 20
End: 2025-03-03

## 2025-03-03 NOTE — TELEPHONE ENCOUNTER
Pt asked for email address to send a document. Writer gave patient the central email for [facility]. He has virtual appt tomorrow with Dr Hua at [facility]. Writer asked if pt could fax it directly to the office. He states he can not fax. Writer informed that document might not get scanned in time for appt. Writer informed that message would be sent to [facility] office to make them aware that it was sent.

## 2025-03-04 ENCOUNTER — TELEMEDICINE (OUTPATIENT)
Dept: PSYCHIATRY | Facility: CLINIC | Age: 20
End: 2025-03-04
Payer: COMMERCIAL

## 2025-03-04 DIAGNOSIS — F90.0 ATTENTION DEFICIT HYPERACTIVITY DISORDER, INATTENTIVE TYPE: ICD-10-CM

## 2025-03-04 PROCEDURE — 99214 OFFICE O/P EST MOD 30 MIN: CPT | Performed by: PSYCHIATRY & NEUROLOGY

## 2025-03-04 RX ORDER — METHYLPHENIDATE HYDROCHLORIDE 36 MG/1
36 TABLET ORAL DAILY
Qty: 30 TABLET | Refills: 0 | Status: SHIPPED | OUTPATIENT
Start: 2025-03-04

## 2025-03-04 NOTE — ASSESSMENT & PLAN NOTE
Orders:    methylphenidate (CONCERTA) 36 MG ER tablet; Take 1 tablet (36 mg total) by mouth daily Max Daily Amount: 36 mg

## 2025-03-04 NOTE — PSYCH
PSYCHIATRIC EVALUATION     Name: Gigi Scott      : 2005      MRN: [id number]  Encounter Provider: Declan Hua MD  Encounter Date: 3/4/2025   Encounter department: [facility]    Insurance: Payor: [employer] / Plan: [employer] [id number] / Product Type: PPO Commercial /      Reason for visit:   Chief Complaint   Patient presents with    Virtual Regular Visit     :  Assessment & Plan  Attention deficit hyperactivity disorder, inattentive type    Orders:    methylphenidate (CONCERTA) 36 MG ER tablet; Take 1 tablet (36 mg total) by mouth daily Max Daily Amount: 36 mg    Attention deficit hyperactivity disorder, inattentive type  Based on his evaluation and review of the records from psychiatric advanced practitioner when he was 12-year-old I believe patient meets the criteria for ADHD predominantly inattentive type.  I still have not received the records from [facility] and will review once received.  He though was also advised to forward his records to [facility] and to see if his diagnosis is based on the new information changes.  The patient also currently struggling with significant attention issues given not taking Concerta.  I will continue with the Concerta at 36 mg daily for ADHD.  The patient was advised that I can decrease the dose given he has not taken for a while but the patient reports feeling comfortable with 36 mg dose.  He was advised in case he feels increasing anxiety irritability racing heart, chest discomfort to call me and I will decrease the dose and also to call crisis 911 to get evaluated for the symptoms specially the physical symptoms.  The patient agreed.  He was again educated about the medication consult in detail and advised to call us if there is any concern and to call crisis, 911 or visit nearby ER in case of any emergency having SI or HI.  Patient verbalized understanding agrees with the current plan.              Treatment Recommendations/Precautions:    Educated about diagnosis and treatment modalities. Verbalizes understanding and agreement with the treatment plan.  Discussed self monitoring of symptoms, and symptom monitoring tools.  Discussed medications and if treatment adjustment was needed or desired.  Aware of 24 hour and weekend coverage for urgent situations accessed by calling Health system main practice number  I am scheduling this patient out for greater than 3 months: No    Medications Risks/Benefits:      Risks, Benefits And Possible Side Effects Of Medications:    Risks, benefits, and possible side effects of medications explained to Gigi and he (or legal representative) verbalizes understanding and agreement for treatment.    Controlled Medication Discussion:     Gigi has been filling controlled prescriptions on time as prescribed according to [place] Prescription Drug Monitoring Program      History of Present Illness     The patient reports he underwent neuropsychological evaluation referred by me since he last saw me.  I was unable to get the records from [facility] and the patient will follow-up with me again but he stated that he was told that he does not meet the criteria for ADHD or autism.  Patient does not agree with the current diagnosis by them.  The patient forwarded to me his records from psychiatric nurse erika who he was seeing when he was 12 years old and had undergone assessment for ADHD.  As per the review of the records it seems that the patient parents and teachers had completed the [place] testing and based on his symptoms also at that time was diagnosed with ADHD.  The patient stated that his parents could not recall much about his ADHD symptoms when they were evaluated recently at the [facility] due to which she was thought to be not having ADHD.  The patient was encouraged to forward the records he had sent  me from his psychiatric nurse petitioner when he was 12-year-old to the [place] [facility] to reassess the evaluation.  The patient agreed.  He reports he has not been taking Concerta for last 3 months as he was not able to get the refill.  The patient though also had not called us to remind us to send a refill for Concerta.  He said he has been struggling with poor attention, getting distracted easily, not able to complete his schoolwork and time and is overdue for his schoolwork.  Reports due to which his anxiety also has been high.  He though denies any panic attacks.  Denies feeling depressed or hopeless.  Denies any SI or HI.  Reports sleep appetite and energy level has been okay.  Denies any other concerns.  He will  fax me his recent neuropsychological evaluation today.    Denies any other concerns.  Denies any other medical issues.    Psychiatric Review Of Systems:    Pertinent items are noted in HPI; all others negative    Review Of Systems: A review of systems is obtained and is negative except for the pertinent positives listed in HPI/Subjective above.          Areas of Improvement: reviewed in HPI/Subjective Section and reviewed in Assessment and Plan Section        Past Medical History:   Diagnosis Date    Acne     Broken nose     Crohn disease (HCC)     Deviated septum         Past Surgical History:   Procedure Laterality Date    NOSE SURGERY       Allergies: No Known Allergies    Current Outpatient Medications   Medication Sig Dispense Refill    methylphenidate (CONCERTA) 36 MG ER tablet Take 1 tablet (36 mg total) by mouth daily Max Daily Amount: 36 mg 30 tablet 0     No current facility-administered medications for this visit.       Medical History Reviewed by provider this encounter:  Tobacco  Allergies  Meds  Problems  Med Hx  Surg Hx  Fam Hx         Objective   There were no vitals taken for this visit.     Mental Status Evaluation:    Appearance age appropriate, casually dressed    Behavior cooperative, calm   Speech normal rate, normal volume, normal pitch, spontaneous   Mood anxious   Affect normal range and intensity, appropriate   Thought Processes organized, goal directed   Thought Content no overt delusions   Perceptual Disturbances: no auditory hallucinations, no visual hallucinations   Abnormal Thoughts  Risk Potential Suicidal ideation - None  Homicidal ideation - None  Potential for aggression - No   Orientation oriented to person, place, time/date, and situation   Memory recent and remote memory grossly intact   Consciousness alert and awake   Attention Span Concentration Span attention span and concentration are age appropriate   Intellect appears to be of average intelligence   Insight intact   Judgement intact   Muscle Strength and  Gait unable to assess today due to virtual visit   Motor activity unable to assess today due to virtual visit   Language no difficulty naming common objects, no difficulty repeating a phrase   Fund of Knowledge adequate knowledge of current events  adequate fund of knowledge regarding past history  adequate fund of knowledge regarding vocabulary    Pain none   Pain Scale 0         Laboratory Results: I have personally reviewed all pertinent laboratory/tests results    Most Recent Labs:   Lab Results   Component Value Date    SODIUM 140 09/27/2022    K 3.9 09/27/2022     09/27/2022    CO2 28 09/27/2022    BUN 9 09/27/2022    CREATININE 0.67 09/27/2022    CALCIUM 8.1 (L) 09/27/2022    AST 10 09/27/2022    ALT 24 09/27/2022    ALKPHOS 65 09/27/2022    TP 6.5 09/27/2022    TBILI 0.2 09/27/2022     Suicide/Homicide Risk Assessment:     Risk of Harm to Self:  The following ratings are based on assessment at the time of the interview  Demographic risk factors include: , male, age: young adult (15-24)  Historical Risk Factors include: history of depression  Recent Specific Risk Factors include:  Diagnosis of ADHD  Protective Factors: no  current suicidal ideation, access to mental health treatment, compliant with medications, compliant with mental health treatment, stable living environment, supportive parents, supportive friends  Weapons: none and no firearms. The following steps have been taken to ensure weapons are properly secured: not applicable  Based on today's assessment, Gigi presents the following risk of harm to self: minimal     Risk of Harm to Others:  The following ratings are based on assessment at the time of the interview  Based on today's assessment, Gigi presents the following risk of harm to others: none     The following interventions are recommended: no intervention changes needed         Treatment Plan:    Completed and signed during the session: Yes - Treatment Plan done but not signed at time of office visit due to:  Plan reviewed by video and verbal consent given due to virtual visit.    Depression Follow-up Plan Completed: No    Note Share: This note was shared with patient.    Administrative Statements       Visit Time  Visit Start Time: 1:06 PM  Visit Stop Time: 1:26 PM  Total Visit Duration:  20 minutes    Declan Hua MD 03/04/25

## 2025-04-02 ENCOUNTER — TELEPHONE (OUTPATIENT)
Dept: PSYCHIATRY | Facility: CLINIC | Age: 20
End: 2025-04-02

## 2025-04-02 NOTE — LETTER
25     Gigi Scott   : 2005   [address] [place] [address] [address] [place] [id number]       It is the policy of [facility] to monitor and manage appointments that have been no-showed or cancelled with less than 48-hour notice. This is necessary to ensure that we are able to provide timely access for all patients to our providers. Undue numbers of unutilized appointments delays necessary medical care for all patients.      Dear Gigi Scott       We are sorry that you missed your appointment with Donnie Gong LPC on 2025. Your health and follow-up care are important to us. As this was a New Patient appointment, you will need to contact the office at [phone] if you would like to reschedule.    Please be aware that our office policy states that if you 'no show' two or more Therapy appointments without prior notice of cancellation within in a calendar year, you may be discharged from Therapy treatment.  We want to bring this to your attention now to prevent an interruption of your care.  If you have any questions about this policy, please call us at the number above.     If we do not hear from you within 10 business days to make a follow up appointment, we will assume you are no longer interested in care here.    Thank you in advance for your cooperation and assistance.       Sincerely,      [facility] Support Staff

## 2025-04-02 NOTE — TELEPHONE ENCOUNTER
NO-SHOW LETTER MAILED TO Gigi Scott.  ADDRESS: [address] [place] [address] [address] [place] [phone]  SENT VIA [employer]    Provider would like this PT to not be scheduled with him if they call to reschedule

## 2025-06-04 ENCOUNTER — TELEMEDICINE (OUTPATIENT)
Dept: PSYCHIATRY | Facility: CLINIC | Age: 20
End: 2025-06-04
Payer: COMMERCIAL

## 2025-06-04 ENCOUNTER — TELEPHONE (OUTPATIENT)
Dept: PSYCHIATRY | Facility: CLINIC | Age: 20
End: 2025-06-04

## 2025-06-04 DIAGNOSIS — F90.0 ATTENTION DEFICIT HYPERACTIVITY DISORDER, INATTENTIVE TYPE: Primary | ICD-10-CM

## 2025-06-04 DIAGNOSIS — F32.A DEPRESSION, UNSPECIFIED DEPRESSION TYPE: ICD-10-CM

## 2025-06-04 PROCEDURE — 99214 OFFICE O/P EST MOD 30 MIN: CPT | Performed by: PSYCHIATRY & NEUROLOGY

## 2025-06-04 RX ORDER — METHYLPHENIDATE HYDROCHLORIDE 36 MG/1
36 TABLET ORAL DAILY
Qty: 30 TABLET | Refills: 0 | Status: SHIPPED | OUTPATIENT
Start: 2025-06-04

## 2025-06-04 NOTE — ASSESSMENT & PLAN NOTE
Orders:    methylphenidate (CONCERTA) 36 MG ER tablet; Take 1 tablet (36 mg total) by mouth daily Max Daily Amount: 36 mg     NEGATIVE

## 2025-06-04 NOTE — PSYCH
MEDICATION MANAGEMENT NOTE    Name: Gigi Scott      : 2005      MRN: [id number]  Encounter Provider: Declan Hua MD  Encounter Date: 2025   Encounter department: [facility]    Insurance: Payor: [employer] / Plan: [employer] [id number] / Product Type: PPO Commercial /      Reason for Visit:   Chief Complaint   Patient presents with    Virtual Regular Visit     :Administrative Statements   Encounter provider Declan Hua MD    The Patient is located at Home and in the following state in which I hold an active license [place].    The patient was identified by name and date of birth. Gigi Scott was informed that this is a telemedicine visit and that the visit is being conducted through the Epic Embedded platform. He agrees to proceed..  My office door was closed. No one else was in the room.  He acknowledged consent and understanding of privacy and security of the video platform. The patient has agreed to participate and understands they can discontinue the visit at any time.    I have spent a total time of 18 minutes in caring for this patient on the day of the visit/encounter including Risks and benefits of tx options, Instructions for management, Patient and family education, Importance of tx compliance, Risk factor reductions, Impressions, Counseling / Coordination of care, Reviewing/placing orders in the medical record (including tests, medications, and/or procedures), and Obtaining or reviewing history  , not including the time spent for establishing the audio/video connection.   Assessment & Plan  Attention deficit hyperactivity disorder, inattentive type    Orders:    methylphenidate (CONCERTA) 36 MG ER tablet; Take 1 tablet (36 mg total) by mouth daily Max Daily Amount: 36 mg    Depression, unspecified depression type         Attention deficit hyperactivity disorder, inattentive type         Depression, unspecified depression type           Patient  attention has been affected since being off the Concerta.  Was doing well on it.  Had no side effects.  Denying any other mental health concerns.  Plan at this time is to restart Concerta 36 mg ER 1 tablet daily in the morning for ADHD.  He was again educated about the medication in detail and advised to call us if there is any concern to call crisis, 911 or visit nearby ER in case of any emergency.  The patient verbalized understanding and agrees with the plan.    Treatment Recommendations:    Educated about diagnosis and treatment modalities. Verbalizes understanding and agreement with the treatment plan.  Discussed self monitoring of symptoms, and symptom monitoring tools.  Discussed medications and if treatment adjustment was needed or desired.  Aware of 24 hour and weekend coverage for urgent situations accessed by calling [facility] main practice number  I am scheduling this patient out for greater than 3 months: No    Medications Risks/Benefits:      Risks, Benefits And Possible Side Effects Of Medications:    Risks, benefits, and possible side effects of medications explained to Gigi and he (or legal representative) verbalizes understanding and agreement for treatment.    Controlled Medication Discussion:     Gigi has been filling controlled prescriptions on time as prescribed according to [place] Prescription Drug Monitoring Program.  Discussed with Gigi the risks of impairment of ability to drive and potential for abuse and addiction related to treatment with stimulant medications. He understands risk of treatment with stimulant medications, agrees to not drive if feels impaired and agrees to take medications as prescribed.      History of Present Illness     The patient reports he has not taking Concerta for last 2 months as he got sick due to his Crohn's disease and did not call us for refill.  He had seen me last in March and said he took Concerta for at least 1  month and was doing well on it.  Reports since being off the Concerta he feels his focus has been affected.  He reports he has significant difficulty paying attention to the task and gets distracted very easily.  He also reports having difficulty paying attention reports and in one-to-one conversation.  He reports he has been trying to look for a job and is unable to focus on applying.  He reports he is finishing school for the semester due to which she feels somewhat better.  He denies any other mental health concerns.  Reports depression has been well-controlled.  Denies any anxiety or irritability.  Reports sleeps late but wakes up also late.  On average sleeping 8 hours at night.  Reports appetite has been normal.  Energy level has been fine.  Denies any other mental health concerns.  Reports was doing well when on Concerta and did not have any side effects.  Denies any other medical issues other than mentioned.    Review Of Systems: A review of systems is obtained and is negative except for the pertinent positives listed in HPI/Subjective above.      Current Rating Scores:     Current PHQ-9   PHQ-2/9 Depression Screening             Areas of Improvement: reviewed in HPI/Subjective Section and reviewed in Assessment and Plan Section      Past Medical History:   Diagnosis Date    Acne     Broken nose     Crohn disease (HCC)     Deviated septum      Past Surgical History:   Procedure Laterality Date    NOSE SURGERY       Allergies: No Known Allergies    Current Outpatient Medications   Medication Instructions    methylphenidate (CONCERTA) 36 mg, Oral, Daily        Substance Abuse History:    Tobacco, Alcohol and Drug Use History     Tobacco Use    Smoking status: Never     Passive exposure: Never    Smokeless tobacco: Never   Vaping Use    Vaping status: Never Used   Substance Use Topics    Alcohol use: Not Currently    Drug use: Not Currently     Types: Marijuana     Comment: in past          Social  History:    Social History     Socioeconomic History    Marital status: Single     Spouse name: Not on file    Number of children: Not on file    Years of education: Not on file    Highest education level: Not on file   Occupational History    Not on file   Other Topics Concern    Not on file   Social History Narrative    Not on file        Family Psychiatric History:     Family History   Problem Relation Name Age of Onset    Celiac disease Mother      Skin cancer Father      Eczema Father      Hypertension Father      Diabetes Maternal Grandmother      Hypertension Maternal Grandmother      Skin cancer Maternal Grandfather      Ulcerative colitis Maternal Grandfather      Skin cancer Paternal Grandmother      Cancer Paternal Grandmother      Skin cancer Paternal Grandfather      Colon cancer Maternal Aunt      Stroke Paternal Aunt         Medical History Reviewed by provider this encounter:  Tobacco  Allergies  Meds  Problems  Med Hx  Surg Hx  Fam Hx          Objective   There were no vitals taken for this visit.     Mental Status Evaluation:    Appearance age appropriate, casually dressed   Behavior cooperative, calm   Speech normal rate, normal volume, normal pitch, spontaneous   Mood euthymic   Affect normal range and intensity, appropriate   Thought Processes organized, goal directed   Thought Content no overt delusions   Perceptual Disturbances: no auditory hallucinations, no visual hallucinations   Abnormal Thoughts  Risk Potential Suicidal ideation - None  Homicidal ideation - None  Potential for aggression - No   Orientation oriented to person, place, time/date, and situation   Memory recent and remote memory grossly intact   Consciousness alert and awake   Attention Span Concentration Span poor attention span, appeared distracted   Intellect appears to be of average intelligence   Insight intact   Judgement intact   Muscle Strength and  Gait unable to assess today due to virtual visit   Motor  activity unable to assess today due to virtual visit   Language no difficulty naming common objects, no difficulty repeating a phrase   Fund of Knowledge adequate knowledge of current events  adequate fund of knowledge regarding past history  adequate fund of knowledge regarding vocabulary        Laboratory Results: I have personally reviewed all pertinent laboratory/tests results    Most Recent Labs:   Lab Results   Component Value Date    SODIUM 140 09/27/2022    K 3.9 09/27/2022     09/27/2022    CO2 28 09/27/2022    BUN 9 09/27/2022    CREATININE 0.67 09/27/2022    CALCIUM 8.1 (L) 09/27/2022    AST 10 09/27/2022    ALT 24 09/27/2022    ALKPHOS 65 09/27/2022    TP 6.5 09/27/2022    TBILI 0.2 09/27/2022       Suicide/Homicide Risk Assessment:     Risk of Harm to Self:  The following ratings are based on assessment at the time of the interview  Demographic risk factors include: , male, age: young adult (15-24)  Historical Risk Factors include: history of depression  Recent Specific Risk Factors include: Diagnosis of ADHD  Protective Factors: no current suicidal ideation, access to mental health treatment, compliant with medications, compliant with mental health treatment, stable living environment, supportive parents, supportive friends  Weapons: none and no firearms. The following steps have been taken to ensure weapons are properly secured: not applicable  Based on today's assessment, Gigi presents the following risk of harm to self: minimal     Risk of Harm to Others:  The following ratings are based on assessment at the time of the interview  Based on today's assessment, Gigi presents the following risk of harm to others: none     The following interventions are recommended: no intervention changes needed     Psychotherapy Provided:     Individual psychotherapy provided: Yes Medications, treatment progress and treatment plan reviewed with Gigi.  Medication education provided to  "Gigi.  Reassurance and supportive therapy provided.   Crisis/safety plan discussed with Gigi.    Treatment Plan:    Completed and signed during the session: Yes - Treatment Plan done but not signed at time of office visit due to: Plan reviewed by video and verbal consent given due to virtual visit.    Goals: Progress towards Treatment Plan goals - Yes, progressing, as evidenced by subjective findings in HPI/Subjective Section and in Assessment and Plan Section    Depression Follow-up Plan Completed: No    Note Share:    This note was shared with patient.          Visit Time  Visit Start Time: 9:30 AM  Visit Stop Time: 9:48 AM  Total Visit Duration: 18 minutes    Portions of the record may have been created with voice recognition software. Occasional wrong word or \"sound a like\" substitutions may have occurred due to the inherent limitations of voice recognition software. Read the chart carefully and recognize, using context, where substitutions have occurred.    Declan Hua MD 06/04/25  "

## 2025-06-04 NOTE — BH TREATMENT PLAN
TREATMENT PLAN (Medication Management Only)        [facility]    Name and Date of Birth:  Gigi Scott 20 y.o. 2005  MRN: [id number]  Date of Treatment Plan: June 4, 2025  Diagnosis/Diagnoses:    1. Attention deficit hyperactivity disorder, inattentive type    2. Depression, unspecified depression type      Strengths/Personal Resources for Self-Care: supportive family, supportive friends, taking medications as prescribed, ability to adapt to life changes, ability to communicate needs, ability to communicate well, ability to listen, ability to reason, ability to understand psychiatric illness, average or above intelligence, family ties.  Area/Areas of need (in own words): depression, attention and concentration problems  1. Long Term Goal:   improve control of ADHD symptoms.  Target Date:6 months - 12/4/2025  Person/Persons responsible for completion of goal: Gigi  2.  Short Term Objective (s) - How will we reach this goal?:   A.  Provider new recommended medication/dosage changes and/or continue medication(s): continue current medications as prescribed.  B.  N/A.  C.  N/A.  Target Date:6 months - 12/4/2025  Person/Persons Responsible for Completion of Goal: Gigi  Progress Towards Goals: continuing treatment  Treatment Modality: medication management every 3 months  Review due 180 days from date of this plan: 6 months - 12/4/2025  Expected length of service: ongoing treatment unless revised  My Physician/PA/NP and I have developed this plan together and I agree to work on the goals and objectives. I understand the treatment goals that were developed for my treatment.   Electronic Signatures: on file (unless signed below)    Declan Hua MD 06/04/25

## 2025-07-31 DIAGNOSIS — F90.0 ATTENTION DEFICIT HYPERACTIVITY DISORDER, INATTENTIVE TYPE: ICD-10-CM

## 2025-08-01 RX ORDER — METHYLPHENIDATE HYDROCHLORIDE 36 MG/1
36 TABLET ORAL DAILY
Qty: 30 TABLET | Refills: 0 | Status: SHIPPED | OUTPATIENT
Start: 2025-08-01